# Patient Record
Sex: MALE | Race: WHITE | NOT HISPANIC OR LATINO
[De-identification: names, ages, dates, MRNs, and addresses within clinical notes are randomized per-mention and may not be internally consistent; named-entity substitution may affect disease eponyms.]

---

## 2017-05-18 ENCOUNTER — APPOINTMENT (OUTPATIENT)
Dept: UROLOGY | Facility: CLINIC | Age: 77
End: 2017-05-18

## 2017-05-18 ENCOUNTER — OUTPATIENT (OUTPATIENT)
Dept: OUTPATIENT SERVICES | Facility: HOSPITAL | Age: 77
LOS: 1 days | End: 2017-05-18
Payer: MEDICARE

## 2017-05-18 VITALS
TEMPERATURE: 98.3 F | SYSTOLIC BLOOD PRESSURE: 118 MMHG | HEIGHT: 70 IN | HEART RATE: 74 BPM | BODY MASS INDEX: 38.65 KG/M2 | WEIGHT: 270 LBS | RESPIRATION RATE: 16 BRPM | DIASTOLIC BLOOD PRESSURE: 67 MMHG

## 2017-05-18 DIAGNOSIS — R35.0 FREQUENCY OF MICTURITION: ICD-10-CM

## 2017-05-18 DIAGNOSIS — D49.519 NEOPLASM OF UNSPECIFIED BEHAVIOR OF UNSPECIFIED KIDNEY: ICD-10-CM

## 2017-05-18 DIAGNOSIS — G56.00 CARPAL TUNNEL SYNDROME, UNSPECIFIED UPPER LIMB: Chronic | ICD-10-CM

## 2017-05-18 DIAGNOSIS — D49.59 NEOPLASM UNSPECIFIED BEHAVIOR OF OTHER GENITOURINARY ORGAN: ICD-10-CM

## 2017-05-18 PROCEDURE — 96401 CHEMO ANTI-NEOPL SQ/IM: CPT

## 2017-05-18 PROCEDURE — 96402 CHEMO HORMON ANTINEOPL SQ/IM: CPT

## 2017-05-18 PROCEDURE — 88112 CYTOPATH CELL ENHANCE TECH: CPT | Mod: 26

## 2017-05-18 RX ORDER — DENOSUMAB 60 MG/ML
60 INJECTION SUBCUTANEOUS
Qty: 1 | Refills: 0 | Status: COMPLETED | OUTPATIENT
Start: 2017-05-18

## 2017-05-18 RX ORDER — LEUPROLIDE ACETATE 45 MG
45 KIT INTRAMUSCULAR
Qty: 1 | Refills: 0 | Status: COMPLETED | OUTPATIENT
Start: 2017-05-18 | End: 2017-05-18

## 2017-05-18 RX ORDER — DENOSUMAB 60 MG/ML
60 INJECTION SUBCUTANEOUS
Qty: 1 | Refills: 0 | Status: COMPLETED | OUTPATIENT
Start: 2017-05-18 | End: 2017-05-18

## 2017-05-18 RX ORDER — LEUPROLIDE ACETATE 45 MG
45 KIT INTRAMUSCULAR
Qty: 1 | Refills: 0 | Status: COMPLETED | OUTPATIENT
Start: 2017-05-18

## 2017-05-18 RX ADMIN — DENOSUMAB 0 MG/ML: 60 INJECTION SUBCUTANEOUS at 00:00

## 2017-05-18 RX ADMIN — LEUPROLIDE ACETATE 0 MG: KIT at 00:00

## 2017-05-21 ENCOUNTER — MESSAGE (OUTPATIENT)
Age: 77
End: 2017-05-21

## 2017-05-21 LAB
ALBUMIN SERPL ELPH-MCNC: 4 G/DL
ALP BLD-CCNC: 71 U/L
ALT SERPL-CCNC: 7 U/L
ANION GAP SERPL CALC-SCNC: 20 MMOL/L
APPEARANCE: CLEAR
AST SERPL-CCNC: 10 U/L
BACTERIA UR CULT: NORMAL
BACTERIA: NEGATIVE
BASOPHILS # BLD AUTO: 0.01 K/UL
BASOPHILS NFR BLD AUTO: 0.2 %
BILIRUB SERPL-MCNC: 0.3 MG/DL
BILIRUBIN URINE: NEGATIVE
BLOOD URINE: NEGATIVE
BUN SERPL-MCNC: 64 MG/DL
CALCIUM SERPL-MCNC: 10.2 MG/DL
CHLORIDE SERPL-SCNC: 105 MMOL/L
CO2 SERPL-SCNC: 13 MMOL/L
COLOR: YELLOW
CORE LAB FLUID CYTOLOGY: NORMAL
CREAT SERPL-MCNC: 2.7 MG/DL
EOSINOPHIL # BLD AUTO: 0.1 K/UL
EOSINOPHIL NFR BLD AUTO: 1.7 %
GLUCOSE QUALITATIVE U: NORMAL MG/DL
GLUCOSE SERPL-MCNC: 170 MG/DL
HCT VFR BLD CALC: 31.8 %
HGB BLD-MCNC: 10 G/DL
HYALINE CASTS: 9 /LPF
IMM GRANULOCYTES NFR BLD AUTO: 0.2 %
KETONES URINE: NEGATIVE
LEUKOCYTE ESTERASE URINE: NEGATIVE
LYMPHOCYTES # BLD AUTO: 1.82 K/UL
LYMPHOCYTES NFR BLD AUTO: 30.2 %
MAN DIFF?: NORMAL
MCHC RBC-ENTMCNC: 30.2 PG
MCHC RBC-ENTMCNC: 31.4 GM/DL
MCV RBC AUTO: 96.1 FL
MICROSCOPIC-UA: NORMAL
MONOCYTES # BLD AUTO: 0.31 K/UL
MONOCYTES NFR BLD AUTO: 5.1 %
NEUTROPHILS # BLD AUTO: 3.77 K/UL
NEUTROPHILS NFR BLD AUTO: 62.6 %
NITRITE URINE: NEGATIVE
PH URINE: 5
PLATELET # BLD AUTO: 202 K/UL
POTASSIUM SERPL-SCNC: 6.8 MMOL/L
PROT SERPL-MCNC: 7.6 G/DL
PROTEIN URINE: NEGATIVE MG/DL
PSA SERPL-MCNC: <0.01 NG/ML
RBC # BLD: 3.31 M/UL
RBC # FLD: 15.1 %
RED BLOOD CELLS URINE: 4 /HPF
SODIUM SERPL-SCNC: 138 MMOL/L
SPECIFIC GRAVITY URINE: 1.02
SQUAMOUS EPITHELIAL CELLS: 2 /HPF
TESTOST SERPL-MCNC: 21.6 NG/DL
UROBILINOGEN URINE: NORMAL MG/DL
WBC # FLD AUTO: 6.02 K/UL
WHITE BLOOD CELLS URINE: 1 /HPF

## 2017-05-23 DIAGNOSIS — D49.59 NEOPLASM OF UNSPECIFIED BEHAVIOR OF OTHER GENITOURINARY ORGAN: ICD-10-CM

## 2017-05-23 DIAGNOSIS — R53.83 OTHER FATIGUE: ICD-10-CM

## 2017-05-23 DIAGNOSIS — E66.01 MORBID (SEVERE) OBESITY DUE TO EXCESS CALORIES: ICD-10-CM

## 2017-05-23 DIAGNOSIS — E11.9 TYPE 2 DIABETES MELLITUS WITHOUT COMPLICATIONS: ICD-10-CM

## 2017-05-23 DIAGNOSIS — C79.51 SECONDARY MALIGNANT NEOPLASM OF BONE: ICD-10-CM

## 2017-05-23 DIAGNOSIS — R53.81 OTHER MALAISE: ICD-10-CM

## 2017-05-23 DIAGNOSIS — C61 MALIGNANT NEOPLASM OF PROSTATE: ICD-10-CM

## 2017-05-23 DIAGNOSIS — D49.519 NEOPLASM OF UNSPECIFIED BEHAVIOR OF UNSPECIFIED KIDNEY: ICD-10-CM

## 2017-07-19 ENCOUNTER — MEDICATION RENEWAL (OUTPATIENT)
Age: 77
End: 2017-07-19

## 2017-07-21 ENCOUNTER — MESSAGE (OUTPATIENT)
Age: 77
End: 2017-07-21

## 2017-07-26 ENCOUNTER — APPOINTMENT (OUTPATIENT)
Dept: UROLOGY | Facility: CLINIC | Age: 77
End: 2017-07-26

## 2017-08-10 ENCOUNTER — TRANSCRIPTION ENCOUNTER (OUTPATIENT)
Age: 77
End: 2017-08-10

## 2017-08-21 ENCOUNTER — OUTPATIENT (OUTPATIENT)
Dept: OUTPATIENT SERVICES | Facility: HOSPITAL | Age: 77
LOS: 1 days | End: 2017-08-21
Payer: MEDICARE

## 2017-08-21 VITALS
HEART RATE: 66 BPM | WEIGHT: 257.94 LBS | TEMPERATURE: 98 F | DIASTOLIC BLOOD PRESSURE: 80 MMHG | SYSTOLIC BLOOD PRESSURE: 153 MMHG | HEIGHT: 69 IN | RESPIRATION RATE: 16 BRPM | OXYGEN SATURATION: 96 %

## 2017-08-21 DIAGNOSIS — Z87.448 PERSONAL HISTORY OF OTHER DISEASES OF URINARY SYSTEM: Chronic | ICD-10-CM

## 2017-08-21 DIAGNOSIS — G56.00 CARPAL TUNNEL SYNDROME, UNSPECIFIED UPPER LIMB: Chronic | ICD-10-CM

## 2017-08-21 DIAGNOSIS — N20.1 CALCULUS OF URETER: ICD-10-CM

## 2017-08-21 DIAGNOSIS — N20.0 CALCULUS OF KIDNEY: ICD-10-CM

## 2017-08-21 DIAGNOSIS — G47.33 OBSTRUCTIVE SLEEP APNEA (ADULT) (PEDIATRIC): ICD-10-CM

## 2017-08-21 DIAGNOSIS — I10 ESSENTIAL (PRIMARY) HYPERTENSION: ICD-10-CM

## 2017-08-21 DIAGNOSIS — Z01.818 ENCOUNTER FOR OTHER PREPROCEDURAL EXAMINATION: ICD-10-CM

## 2017-08-21 LAB
ANION GAP SERPL CALC-SCNC: 18 MMOL/L — HIGH (ref 5–17)
BUN SERPL-MCNC: 59 MG/DL — HIGH (ref 7–23)
CALCIUM SERPL-MCNC: 9.6 MG/DL — SIGNIFICANT CHANGE UP (ref 8.4–10.5)
CHLORIDE SERPL-SCNC: 103 MMOL/L — SIGNIFICANT CHANGE UP (ref 96–108)
CO2 SERPL-SCNC: 19 MMOL/L — LOW (ref 22–31)
CREAT SERPL-MCNC: 1.91 MG/DL — HIGH (ref 0.5–1.3)
GLUCOSE SERPL-MCNC: 185 MG/DL — HIGH (ref 70–99)
HBA1C BLD-MCNC: 8.5 % — HIGH (ref 4–5.6)
HCT VFR BLD CALC: 29.5 % — LOW (ref 39–50)
HGB BLD-MCNC: 9.3 G/DL — LOW (ref 13–17)
MCHC RBC-ENTMCNC: 29.3 PG — SIGNIFICANT CHANGE UP (ref 27–34)
MCHC RBC-ENTMCNC: 31.5 GM/DL — LOW (ref 32–36)
MCV RBC AUTO: 93.1 FL — SIGNIFICANT CHANGE UP (ref 80–100)
PLATELET # BLD AUTO: 205 K/UL — SIGNIFICANT CHANGE UP (ref 150–400)
POTASSIUM SERPL-MCNC: 4.9 MMOL/L — SIGNIFICANT CHANGE UP (ref 3.5–5.3)
POTASSIUM SERPL-SCNC: 4.9 MMOL/L — SIGNIFICANT CHANGE UP (ref 3.5–5.3)
RBC # BLD: 3.17 M/UL — LOW (ref 4.2–5.8)
RBC # FLD: 14.9 % — HIGH (ref 10.3–14.5)
SODIUM SERPL-SCNC: 140 MMOL/L — SIGNIFICANT CHANGE UP (ref 135–145)
WBC # BLD: 5.4 K/UL — SIGNIFICANT CHANGE UP (ref 3.8–10.5)
WBC # FLD AUTO: 5.4 K/UL — SIGNIFICANT CHANGE UP (ref 3.8–10.5)

## 2017-08-21 PROCEDURE — G0463: CPT

## 2017-08-21 PROCEDURE — 85027 COMPLETE CBC AUTOMATED: CPT

## 2017-08-21 PROCEDURE — 83036 HEMOGLOBIN GLYCOSYLATED A1C: CPT

## 2017-08-21 PROCEDURE — 87086 URINE CULTURE/COLONY COUNT: CPT

## 2017-08-21 PROCEDURE — 87186 SC STD MICRODIL/AGAR DIL: CPT

## 2017-08-21 PROCEDURE — 80048 BASIC METABOLIC PNL TOTAL CA: CPT

## 2017-08-21 NOTE — H&P PST ADULT - PMH
Anemia    Calculus of kidney    Diabetes Mellitus Type II    Glaucoma    History of Prostate Cancer  dx 1998  HTN (Hypertension)    Hyperlipidemia    Obese    Pneumonia  1/2013 treated with PO antibiotics  Seasonal allergies    Sleep apnea  on CPAP  Smoker  1.5 PPD x 25 years quit 1977 Anemia    Calculus of kidney    Diabetes Mellitus Type II    Glaucoma    History of Prostate Cancer  dx 1998 s/p Brachytherapy  HTN (Hypertension)    Hyperlipidemia    Obese    Pneumonia  1/2013 treated with PO antibiotics  Seasonal allergies    Sleep apnea  on CPAP  Smoker  1.5 PPD x 25 years quit 1977

## 2017-08-21 NOTE — H&P PST ADULT - HISTORY OF PRESENT ILLNESS
77 yrs old male with h/o urethral stricture presents for preop eval to have cystoscopy and urethral dilatation on 7/7/10. pt states that he had urethral  stricture for 2 yrs now and has urinary hesitancy and needed to take care of it 77 yrs old male with h/o prostate cancer, urethral stricture-s/p artificial urinary sphincter in 2015-f/u evaluation- elevated creatinine in 07/2017. Pt had urology consult- s/p Ct scan renal revealed calculus of kidney/ureter-s/p left nephrostomy tube insertion in 07/2017- presents for preop eval to have left nephroscopy, laser of stones, left antegrade ureteroscopy with laser, exchange of  left nephrostomy to left nephroureteral catheter on 08/28/2017

## 2017-08-21 NOTE — H&P PST ADULT - PSH
Carpal tunnel syndrome  right wrist 2013  H/O malignant melanoma of skin right ear, excision of neoplasm 11/2011    H/O nephrostomy  07/2017  History of Colonoscopy    History of Cystoscopy multiple  urethral dilation, intravesical anesthetic  History of radiation therapy  for prostate cancer 1998, brachytherapy  Lipoma resection from stomach in 1990    Macular Pucker Left  surgery 6/2010

## 2017-08-21 NOTE — H&P PST ADULT - PROBLEM SELECTOR PLAN 1
Left nephroscopy, laser of stones, left antegrade ureteroscopy with laser, exchange of  left nephrostomy to left nephroureteral catheter

## 2017-08-24 RX ORDER — CIPROFLOXACIN HYDROCHLORIDE 500 MG/1
500 TABLET, FILM COATED ORAL TWICE DAILY
Qty: 20 | Refills: 0 | Status: DISCONTINUED | COMMUNITY
Start: 2017-08-24 | End: 2017-08-24

## 2017-08-27 ENCOUNTER — FORM ENCOUNTER (OUTPATIENT)
Age: 77
End: 2017-08-27

## 2017-08-28 ENCOUNTER — OUTPATIENT (OUTPATIENT)
Dept: OUTPATIENT SERVICES | Facility: HOSPITAL | Age: 77
LOS: 1 days | End: 2017-08-28
Payer: MEDICARE

## 2017-08-28 ENCOUNTER — APPOINTMENT (OUTPATIENT)
Dept: UROLOGY | Facility: HOSPITAL | Age: 77
End: 2017-08-28

## 2017-08-28 ENCOUNTER — RESULT REVIEW (OUTPATIENT)
Age: 77
End: 2017-08-28

## 2017-08-28 ENCOUNTER — TRANSCRIPTION ENCOUNTER (OUTPATIENT)
Age: 77
End: 2017-08-28

## 2017-08-28 VITALS
OXYGEN SATURATION: 96 % | RESPIRATION RATE: 16 BRPM | TEMPERATURE: 98 F | HEART RATE: 55 BPM | SYSTOLIC BLOOD PRESSURE: 154 MMHG | DIASTOLIC BLOOD PRESSURE: 70 MMHG

## 2017-08-28 VITALS
RESPIRATION RATE: 18 BRPM | OXYGEN SATURATION: 97 % | HEART RATE: 71 BPM | SYSTOLIC BLOOD PRESSURE: 129 MMHG | TEMPERATURE: 99 F | DIASTOLIC BLOOD PRESSURE: 67 MMHG | WEIGHT: 257.94 LBS | HEIGHT: 69 IN

## 2017-08-28 DIAGNOSIS — N20.1 CALCULUS OF URETER: ICD-10-CM

## 2017-08-28 DIAGNOSIS — G56.00 CARPAL TUNNEL SYNDROME, UNSPECIFIED UPPER LIMB: Chronic | ICD-10-CM

## 2017-08-28 DIAGNOSIS — Z87.448 PERSONAL HISTORY OF OTHER DISEASES OF URINARY SYSTEM: Chronic | ICD-10-CM

## 2017-08-28 DIAGNOSIS — N20.0 CALCULUS OF KIDNEY: ICD-10-CM

## 2017-08-28 LAB — SURGICAL PATHOLOGY STUDY: SIGNIFICANT CHANGE UP

## 2017-08-28 PROCEDURE — 74000: CPT | Mod: 26

## 2017-08-28 PROCEDURE — 82365 CALCULUS SPECTROSCOPY: CPT

## 2017-08-28 PROCEDURE — C1887: CPT

## 2017-08-28 PROCEDURE — C1758: CPT

## 2017-08-28 PROCEDURE — 50080 PERQ NL/PL LITHOTRP SMPL<2CM: CPT | Mod: LT

## 2017-08-28 PROCEDURE — 50433 PLMT NEPHROURETERAL CATHETER: CPT | Mod: LT

## 2017-08-28 PROCEDURE — 52353 CYSTOURETERO W/LITHOTRIPSY: CPT | Mod: 59,LT

## 2017-08-28 PROCEDURE — 88300 SURGICAL PATH GROSS: CPT | Mod: 26

## 2017-08-28 PROCEDURE — 76000 FLUOROSCOPY <1 HR PHYS/QHP: CPT

## 2017-08-28 PROCEDURE — 74018 RADEX ABDOMEN 1 VIEW: CPT

## 2017-08-28 PROCEDURE — 52353 CYSTOURETERO W/LITHOTRIPSY: CPT | Mod: LT

## 2017-08-28 PROCEDURE — 88300 SURGICAL PATH GROSS: CPT

## 2017-08-28 PROCEDURE — C1894: CPT

## 2017-08-28 PROCEDURE — C1769: CPT

## 2017-08-28 PROCEDURE — C2617: CPT

## 2017-08-28 PROCEDURE — C1889: CPT

## 2017-08-28 RX ORDER — ONDANSETRON 8 MG/1
4 TABLET, FILM COATED ORAL
Qty: 0 | Refills: 0 | Status: DISCONTINUED | OUTPATIENT
Start: 2017-08-28 | End: 2017-08-28

## 2017-08-28 RX ORDER — OXYCODONE HYDROCHLORIDE 5 MG/1
1 TABLET ORAL
Qty: 28 | Refills: 0
Start: 2017-08-28 | End: 2017-09-04

## 2017-08-28 RX ORDER — CEFAZOLIN SODIUM 1 G
2000 VIAL (EA) INJECTION ONCE
Qty: 0 | Refills: 0 | Status: COMPLETED | OUTPATIENT
Start: 2017-08-28 | End: 2017-08-28

## 2017-08-28 RX ORDER — SODIUM CHLORIDE 9 MG/ML
1000 INJECTION, SOLUTION INTRAVENOUS
Qty: 0 | Refills: 0 | Status: DISCONTINUED | OUTPATIENT
Start: 2017-08-28 | End: 2017-09-12

## 2017-08-28 RX ORDER — SODIUM CHLORIDE 9 MG/ML
3 INJECTION INTRAMUSCULAR; INTRAVENOUS; SUBCUTANEOUS EVERY 8 HOURS
Qty: 0 | Refills: 0 | Status: DISCONTINUED | OUTPATIENT
Start: 2017-08-28 | End: 2017-08-28

## 2017-08-28 RX ORDER — HYDROMORPHONE HYDROCHLORIDE 2 MG/ML
0.5 INJECTION INTRAMUSCULAR; INTRAVENOUS; SUBCUTANEOUS
Qty: 0 | Refills: 0 | Status: DISCONTINUED | OUTPATIENT
Start: 2017-08-28 | End: 2017-08-28

## 2017-08-28 RX ADMIN — SODIUM CHLORIDE 125 MILLILITER(S): 9 INJECTION, SOLUTION INTRAVENOUS at 09:44

## 2017-08-28 NOTE — ASU DISCHARGE PLAN (ADULT/PEDIATRIC). - MEDICATION SUMMARY - MEDICATIONS TO TAKE
I will START or STAY ON the medications listed below when I get home from the hospital:    b12  -- 1000  mg  -- Indication: For vitamin    acetaminophen-oxycodone 325 mg-5 mg oral tablet  -- 1-2  tab(s) by mouth every 4-6 hours, As Needed MDD:6  -- Caution federal law prohibits the transfer of this drug to any person other  than the person for whom it was prescribed.  May cause drowsiness.  Alcohol may intensify this effect.  Use care when operating dangerous machinery.  This prescription cannot be refilled.  This product contains acetaminophen.  Do not use  with any other product containing acetaminophen to prevent possible liver damage.  Using more of this medication than prescribed may cause serious breathing problems.    -- Indication: For pain    glimepiride 4 mg oral tablet  --  by mouth 2 times a day  -- Indication: For diabetes    simvastatin 20 mg oral tablet  -- 1 tab(s) by mouth once a day (at bedtime)  -- Indication: For Cholesterol    Lupron Depot 45 mg/6 months intramuscular injection, extended release  --  intramuscular  every 6 months  last dose Oct. 10.2015  -- Indication: For prostate cancer    carvedilol 12.5 mg oral tablet  -- 1 tab(s) by mouth 2 times a day  -- Indication: For heart    hydroCHLOROthiazide 12.5 mg oral tablet  -- 1 tab(s) by mouth once a day  -- Indication: For blood pressure    ferrous sulfate 324 mg (65 mg elemental iron) oral delayed release tablet  --  by mouth   -- Indication: For iron    calcium citrate  --  by mouth 1000 mg   -- Indication: For vitamin    Bactrim  mg-160 mg oral tablet  -- 1 tab(s) by mouth 2 times a day  -- Indication: For antibiotic    Vitamin C 500 mg oral tablet  -- 1 tab(s) by mouth once a day  -- Indication: For vitamin

## 2017-08-28 NOTE — BRIEF OPERATIVE NOTE - PROCEDURE
Ureteroscopy of left ureter with use of laser  08/28/2017    Active  [email or web address]  Percutaneous nephrostolithotomy  08/28/2017    Active  [email or web address]

## 2017-08-29 ENCOUNTER — RX RENEWAL (OUTPATIENT)
Age: 77
End: 2017-08-29

## 2017-08-29 LAB — COMPN STONE: SIGNIFICANT CHANGE UP

## 2017-09-06 ENCOUNTER — APPOINTMENT (OUTPATIENT)
Dept: UROLOGY | Facility: CLINIC | Age: 77
End: 2017-09-06
Payer: MEDICARE

## 2017-09-06 DIAGNOSIS — N13.2 HYDRONEPHROSIS WITH RENAL AND URETERAL CALCULOUS OBSTRUCTION: ICD-10-CM

## 2017-09-06 DIAGNOSIS — N20.1 CALCULUS OF URETER: ICD-10-CM

## 2017-09-06 DIAGNOSIS — Z13.228 ENCOUNTER FOR SCREENING FOR OTHER METABOLIC DISORDERS: ICD-10-CM

## 2017-09-06 PROCEDURE — 99214 OFFICE O/P EST MOD 30 MIN: CPT | Mod: 24

## 2017-11-02 ENCOUNTER — FORM ENCOUNTER (OUTPATIENT)
Age: 77
End: 2017-11-02

## 2017-11-03 ENCOUNTER — APPOINTMENT (OUTPATIENT)
Dept: ULTRASOUND IMAGING | Facility: IMAGING CENTER | Age: 77
End: 2017-11-03
Payer: MEDICARE

## 2017-11-03 ENCOUNTER — OUTPATIENT (OUTPATIENT)
Dept: OUTPATIENT SERVICES | Facility: HOSPITAL | Age: 77
LOS: 1 days | End: 2017-11-03
Payer: MEDICARE

## 2017-11-03 ENCOUNTER — APPOINTMENT (OUTPATIENT)
Dept: UROLOGY | Facility: CLINIC | Age: 77
End: 2017-11-03
Payer: MEDICARE

## 2017-11-03 DIAGNOSIS — G56.00 CARPAL TUNNEL SYNDROME, UNSPECIFIED UPPER LIMB: Chronic | ICD-10-CM

## 2017-11-03 DIAGNOSIS — N20.9 URINARY CALCULUS, UNSPECIFIED: ICD-10-CM

## 2017-11-03 DIAGNOSIS — Z87.448 PERSONAL HISTORY OF OTHER DISEASES OF URINARY SYSTEM: Chronic | ICD-10-CM

## 2017-11-03 PROCEDURE — 76770 US EXAM ABDO BACK WALL COMP: CPT | Mod: 26

## 2017-11-03 PROCEDURE — 76770 US EXAM ABDO BACK WALL COMP: CPT

## 2017-11-03 PROCEDURE — 99214 OFFICE O/P EST MOD 30 MIN: CPT | Mod: 24

## 2017-11-09 ENCOUNTER — OUTPATIENT (OUTPATIENT)
Dept: OUTPATIENT SERVICES | Facility: HOSPITAL | Age: 77
LOS: 1 days | End: 2017-11-09
Payer: MEDICARE

## 2017-11-09 ENCOUNTER — APPOINTMENT (OUTPATIENT)
Dept: UROLOGY | Facility: CLINIC | Age: 77
End: 2017-11-09
Payer: MEDICARE

## 2017-11-09 VITALS
SYSTOLIC BLOOD PRESSURE: 143 MMHG | HEART RATE: 62 BPM | TEMPERATURE: 98.6 F | RESPIRATION RATE: 16 BRPM | DIASTOLIC BLOOD PRESSURE: 73 MMHG

## 2017-11-09 DIAGNOSIS — Z87.448 PERSONAL HISTORY OF OTHER DISEASES OF URINARY SYSTEM: Chronic | ICD-10-CM

## 2017-11-09 DIAGNOSIS — R35.0 FREQUENCY OF MICTURITION: ICD-10-CM

## 2017-11-09 DIAGNOSIS — G56.00 CARPAL TUNNEL SYNDROME, UNSPECIFIED UPPER LIMB: Chronic | ICD-10-CM

## 2017-11-09 PROCEDURE — 99214 OFFICE O/P EST MOD 30 MIN: CPT

## 2017-11-09 PROCEDURE — 96402 CHEMO HORMON ANTINEOPL SQ/IM: CPT

## 2017-11-09 PROCEDURE — 96401 CHEMO ANTI-NEOPL SQ/IM: CPT

## 2017-11-09 RX ORDER — LEUPROLIDE ACETATE 45 MG
45 KIT INTRAMUSCULAR
Qty: 1 | Refills: 0 | Status: COMPLETED | OUTPATIENT
Start: 2017-11-09

## 2017-11-09 RX ORDER — DENOSUMAB 60 MG/ML
60 INJECTION SUBCUTANEOUS
Qty: 0 | Refills: 0 | Status: COMPLETED | OUTPATIENT
Start: 2017-11-09

## 2017-11-09 RX ORDER — DENOSUMAB 60 MG/ML
60 INJECTION SUBCUTANEOUS
Qty: 1 | Refills: 0 | Status: COMPLETED | OUTPATIENT
Start: 2017-11-09 | End: 2017-11-09

## 2017-11-09 RX ADMIN — LEUPROLIDE ACETATE 0 MG: KIT at 00:00

## 2017-11-09 RX ADMIN — DENOSUMAB 0 MG/ML: 60 INJECTION SUBCUTANEOUS at 00:00

## 2017-11-10 LAB
ALBUMIN SERPL ELPH-MCNC: 4.1 G/DL
ALP BLD-CCNC: 63 U/L
ALT SERPL-CCNC: 17 U/L
ANION GAP SERPL CALC-SCNC: 13 MMOL/L
APPEARANCE: CLEAR
AST SERPL-CCNC: 12 U/L
BACTERIA: NEGATIVE
BASOPHILS # BLD AUTO: 0.01 K/UL
BASOPHILS NFR BLD AUTO: 0.2 %
BILIRUB SERPL-MCNC: 0.2 MG/DL
BILIRUBIN URINE: NEGATIVE
BLOOD URINE: NEGATIVE
BUN SERPL-MCNC: 50 MG/DL
CALCIUM SERPL-MCNC: 10.5 MG/DL
CHLORIDE SERPL-SCNC: 97 MMOL/L
CO2 SERPL-SCNC: 27 MMOL/L
COLOR: YELLOW
CORE LAB FLUID CYTOLOGY: NORMAL
CREAT SERPL-MCNC: 1.8 MG/DL
EOSINOPHIL # BLD AUTO: 0.15 K/UL
EOSINOPHIL NFR BLD AUTO: 2.4 %
GLUCOSE QUALITATIVE U: NEGATIVE MG/DL
GLUCOSE SERPL-MCNC: 227 MG/DL
HCT VFR BLD CALC: 32.8 %
HGB BLD-MCNC: 10.3 G/DL
HYALINE CASTS: 1 /LPF
IMM GRANULOCYTES NFR BLD AUTO: 0 %
KETONES URINE: NEGATIVE
LEUKOCYTE ESTERASE URINE: NEGATIVE
LYMPHOCYTES # BLD AUTO: 2.26 K/UL
LYMPHOCYTES NFR BLD AUTO: 36 %
MAN DIFF?: NORMAL
MCHC RBC-ENTMCNC: 29.6 PG
MCHC RBC-ENTMCNC: 31.4 GM/DL
MCV RBC AUTO: 94.3 FL
MICROSCOPIC-UA: NORMAL
MONOCYTES # BLD AUTO: 0.39 K/UL
MONOCYTES NFR BLD AUTO: 6.2 %
NEUTROPHILS # BLD AUTO: 3.47 K/UL
NEUTROPHILS NFR BLD AUTO: 55.2 %
NITRITE URINE: NEGATIVE
PH URINE: 5
PLATELET # BLD AUTO: 180 K/UL
POTASSIUM SERPL-SCNC: 5.1 MMOL/L
PROT SERPL-MCNC: 8.1 G/DL
PROTEIN URINE: ABNORMAL MG/DL
PSA SERPL-MCNC: <0.01 NG/ML
RBC # BLD: 3.48 M/UL
RBC # FLD: 16 %
RED BLOOD CELLS URINE: 3 /HPF
SODIUM SERPL-SCNC: 137 MMOL/L
SPECIFIC GRAVITY URINE: 1.02
SQUAMOUS EPITHELIAL CELLS: 0 /HPF
TESTOST SERPL-MCNC: 17.5 NG/DL
UROBILINOGEN URINE: NEGATIVE MG/DL
WBC # FLD AUTO: 6.28 K/UL
WHITE BLOOD CELLS URINE: 1 /HPF

## 2017-11-10 RX ORDER — LEUPROLIDE ACETATE 45 MG
45 KIT INTRAMUSCULAR
Qty: 1 | Refills: 0 | Status: COMPLETED | COMMUNITY
Start: 2017-11-09 | End: 2017-11-09

## 2017-11-12 LAB — BACTERIA UR CULT: ABNORMAL

## 2017-11-15 DIAGNOSIS — N20.0 CALCULUS OF KIDNEY: ICD-10-CM

## 2017-11-15 DIAGNOSIS — E66.01 MORBID (SEVERE) OBESITY DUE TO EXCESS CALORIES: ICD-10-CM

## 2017-11-15 DIAGNOSIS — Z91.89 OTHER SPECIFIED PERSONAL RISK FACTORS, NOT ELSEWHERE CLASSIFIED: ICD-10-CM

## 2017-11-15 DIAGNOSIS — C61 MALIGNANT NEOPLASM OF PROSTATE: ICD-10-CM

## 2018-05-02 ENCOUNTER — APPOINTMENT (OUTPATIENT)
Dept: UROLOGY | Facility: CLINIC | Age: 78
End: 2018-05-02

## 2018-05-02 ENCOUNTER — FORM ENCOUNTER (OUTPATIENT)
Age: 78
End: 2018-05-02

## 2018-05-03 ENCOUNTER — OUTPATIENT (OUTPATIENT)
Dept: OUTPATIENT SERVICES | Facility: HOSPITAL | Age: 78
LOS: 1 days | End: 2018-05-03
Payer: MEDICARE

## 2018-05-03 ENCOUNTER — APPOINTMENT (OUTPATIENT)
Dept: ULTRASOUND IMAGING | Facility: IMAGING CENTER | Age: 78
End: 2018-05-03
Payer: MEDICARE

## 2018-05-03 DIAGNOSIS — G56.00 CARPAL TUNNEL SYNDROME, UNSPECIFIED UPPER LIMB: Chronic | ICD-10-CM

## 2018-05-03 DIAGNOSIS — N20.9 URINARY CALCULUS, UNSPECIFIED: ICD-10-CM

## 2018-05-03 DIAGNOSIS — Z87.448 PERSONAL HISTORY OF OTHER DISEASES OF URINARY SYSTEM: Chronic | ICD-10-CM

## 2018-05-03 PROCEDURE — 76770 US EXAM ABDO BACK WALL COMP: CPT

## 2018-05-03 PROCEDURE — 76770 US EXAM ABDO BACK WALL COMP: CPT | Mod: 26

## 2018-05-10 ENCOUNTER — OUTPATIENT (OUTPATIENT)
Dept: OUTPATIENT SERVICES | Facility: HOSPITAL | Age: 78
LOS: 1 days | End: 2018-05-10
Payer: MEDICARE

## 2018-05-10 ENCOUNTER — APPOINTMENT (OUTPATIENT)
Dept: UROLOGY | Facility: CLINIC | Age: 78
End: 2018-05-10
Payer: MEDICARE

## 2018-05-10 ENCOUNTER — LABORATORY RESULT (OUTPATIENT)
Age: 78
End: 2018-05-10

## 2018-05-10 VITALS
DIASTOLIC BLOOD PRESSURE: 78 MMHG | SYSTOLIC BLOOD PRESSURE: 140 MMHG | RESPIRATION RATE: 17 BRPM | TEMPERATURE: 99.6 F | HEART RATE: 74 BPM

## 2018-05-10 DIAGNOSIS — Z87.448 PERSONAL HISTORY OF OTHER DISEASES OF URINARY SYSTEM: Chronic | ICD-10-CM

## 2018-05-10 DIAGNOSIS — R35.0 FREQUENCY OF MICTURITION: ICD-10-CM

## 2018-05-10 DIAGNOSIS — G56.00 CARPAL TUNNEL SYNDROME, UNSPECIFIED UPPER LIMB: Chronic | ICD-10-CM

## 2018-05-10 LAB
BASOPHILS # BLD AUTO: 0 K/UL
BASOPHILS NFR BLD AUTO: 0 %
EOSINOPHIL # BLD AUTO: 0.1 K/UL
EOSINOPHIL NFR BLD AUTO: 2.1 %
HBA1C MFR BLD HPLC: 12.8 %
HCT VFR BLD CALC: 30.8 %
HGB BLD-MCNC: 9.9 G/DL
IMM GRANULOCYTES NFR BLD AUTO: 0.2 %
LYMPHOCYTES # BLD AUTO: 1.21 K/UL
LYMPHOCYTES NFR BLD AUTO: 25.5 %
MAN DIFF?: NORMAL
MCHC RBC-ENTMCNC: 30.7 PG
MCHC RBC-ENTMCNC: 32.1 GM/DL
MCV RBC AUTO: 95.7 FL
MONOCYTES # BLD AUTO: 0.42 K/UL
MONOCYTES NFR BLD AUTO: 8.9 %
NEUTROPHILS # BLD AUTO: 3 K/UL
NEUTROPHILS NFR BLD AUTO: 63.3 %
PLATELET # BLD AUTO: 208 K/UL
RBC # BLD: 3.22 M/UL
RBC # FLD: 15.4 %
WBC # FLD AUTO: 4.74 K/UL

## 2018-05-10 PROCEDURE — 99214 OFFICE O/P EST MOD 30 MIN: CPT

## 2018-05-10 RX ORDER — LEUPROLIDE ACETATE 45 MG
45 KIT INTRAMUSCULAR
Qty: 1 | Refills: 0 | Status: COMPLETED | OUTPATIENT
Start: 2018-05-10

## 2018-05-10 RX ORDER — LEUPROLIDE ACETATE 45 MG
45 KIT INTRAMUSCULAR
Qty: 1 | Refills: 0 | Status: COMPLETED | OUTPATIENT
Start: 2018-05-10 | End: 2018-05-10

## 2018-05-10 RX ADMIN — LEUPROLIDE ACETATE 0 MG: KIT at 00:00

## 2018-05-11 LAB
ALBUMIN SERPL ELPH-MCNC: 3.8 G/DL
ALP BLD-CCNC: 77 U/L
ALT SERPL-CCNC: 8 U/L
ANION GAP SERPL CALC-SCNC: 17 MMOL/L
APPEARANCE: CLEAR
AST SERPL-CCNC: 9 U/L
BACTERIA: NEGATIVE
BILIRUB SERPL-MCNC: 0.3 MG/DL
BILIRUBIN URINE: NEGATIVE
BLOOD URINE: ABNORMAL
BUN SERPL-MCNC: 51 MG/DL
CALCIUM SERPL-MCNC: 10.4 MG/DL
CHLORIDE SERPL-SCNC: 98 MMOL/L
CO2 SERPL-SCNC: 24 MMOL/L
COLOR: YELLOW
CREAT SERPL-MCNC: 1.98 MG/DL
GLUCOSE QUALITATIVE U: 500 MG/DL
GLUCOSE SERPL-MCNC: 308 MG/DL
HYALINE CASTS: 2 /LPF
KETONES URINE: NEGATIVE
LEUKOCYTE ESTERASE URINE: NEGATIVE
MICROSCOPIC-UA: NORMAL
NITRITE URINE: NEGATIVE
PH URINE: 5.5
POTASSIUM SERPL-SCNC: 5.3 MMOL/L
PROT SERPL-MCNC: 7.9 G/DL
PROTEIN URINE: 30 MG/DL
PSA SERPL-MCNC: <0.01 NG/ML
RED BLOOD CELLS URINE: 4 /HPF
SODIUM SERPL-SCNC: 139 MMOL/L
SPECIFIC GRAVITY URINE: 1.02
SQUAMOUS EPITHELIAL CELLS: 1 /HPF
T3FREE SERPL-MCNC: 2.36 PG/ML
T3RU NFR SERPL: 0.9 INDEX
T4 FREE SERPL-MCNC: 1.2 NG/DL
TESTOST SERPL-MCNC: 21 NG/DL
TSH SERPL-ACNC: 0.6 UIU/ML
UROBILINOGEN URINE: NEGATIVE MG/DL
WHITE BLOOD CELLS URINE: 4 /HPF

## 2018-05-11 PROCEDURE — 96402 CHEMO HORMON ANTINEOPL SQ/IM: CPT

## 2018-05-14 LAB
BACTERIA UR CULT: ABNORMAL
CORE LAB FLUID CYTOLOGY: NORMAL

## 2018-05-15 DIAGNOSIS — C61 MALIGNANT NEOPLASM OF PROSTATE: ICD-10-CM

## 2018-05-15 DIAGNOSIS — E11.9 TYPE 2 DIABETES MELLITUS WITHOUT COMPLICATIONS: ICD-10-CM

## 2018-05-15 DIAGNOSIS — R53.81 OTHER MALAISE: ICD-10-CM

## 2018-08-09 ENCOUNTER — APPOINTMENT (OUTPATIENT)
Dept: UROLOGY | Facility: CLINIC | Age: 78
End: 2018-08-09
Payer: MEDICARE

## 2018-08-09 ENCOUNTER — OUTPATIENT (OUTPATIENT)
Dept: OUTPATIENT SERVICES | Facility: HOSPITAL | Age: 78
LOS: 1 days | End: 2018-08-09
Payer: MEDICARE

## 2018-08-09 VITALS
RESPIRATION RATE: 17 BRPM | TEMPERATURE: 98.4 F | DIASTOLIC BLOOD PRESSURE: 76 MMHG | HEART RATE: 79 BPM | SYSTOLIC BLOOD PRESSURE: 136 MMHG

## 2018-08-09 DIAGNOSIS — Z87.448 PERSONAL HISTORY OF OTHER DISEASES OF URINARY SYSTEM: Chronic | ICD-10-CM

## 2018-08-09 DIAGNOSIS — G56.00 CARPAL TUNNEL SYNDROME, UNSPECIFIED UPPER LIMB: Chronic | ICD-10-CM

## 2018-08-09 DIAGNOSIS — R35.0 FREQUENCY OF MICTURITION: ICD-10-CM

## 2018-08-09 PROCEDURE — 99214 OFFICE O/P EST MOD 30 MIN: CPT

## 2018-08-09 PROCEDURE — 96401 CHEMO ANTI-NEOPL SQ/IM: CPT

## 2018-08-09 RX ORDER — DENOSUMAB 60 MG/ML
60 INJECTION SUBCUTANEOUS
Qty: 0 | Refills: 0 | Status: COMPLETED | OUTPATIENT
Start: 2018-08-09

## 2018-08-09 RX ORDER — DENOSUMAB 60 MG/ML
60 INJECTION SUBCUTANEOUS
Qty: 1 | Refills: 0 | Status: COMPLETED | OUTPATIENT
Start: 2018-08-09 | End: 2018-08-09

## 2018-08-09 RX ADMIN — DENOSUMAB 0 MG/ML: 60 INJECTION SUBCUTANEOUS at 00:00

## 2018-08-13 DIAGNOSIS — Z91.89 OTHER SPECIFIED PERSONAL RISK FACTORS, NOT ELSEWHERE CLASSIFIED: ICD-10-CM

## 2018-08-13 DIAGNOSIS — E55.9 VITAMIN D DEFICIENCY, UNSPECIFIED: ICD-10-CM

## 2018-08-13 DIAGNOSIS — E11.9 TYPE 2 DIABETES MELLITUS WITHOUT COMPLICATIONS: ICD-10-CM

## 2018-08-13 DIAGNOSIS — C61 MALIGNANT NEOPLASM OF PROSTATE: ICD-10-CM

## 2018-08-27 LAB
24R-OH-CALCIDIOL SERPL-MCNC: 29.8 PG/ML
25(OH)D3 SERPL-MCNC: 33 NG/ML
ALBUMIN SERPL ELPH-MCNC: 4 G/DL
ALP BLD-CCNC: 67 U/L
ALT SERPL-CCNC: 12 U/L
ANION GAP SERPL CALC-SCNC: 17 MMOL/L
APPEARANCE: CLEAR
AST SERPL-CCNC: 13 U/L
BACTERIA UR CULT: ABNORMAL
BACTERIA: NEGATIVE
BASOPHILS # BLD AUTO: 0.01 K/UL
BASOPHILS NFR BLD AUTO: 0.2 %
BILIRUB SERPL-MCNC: 0.2 MG/DL
BILIRUBIN URINE: NEGATIVE
BLOOD URINE: NEGATIVE
BUN SERPL-MCNC: 61 MG/DL
CALCIUM SERPL-MCNC: 9.7 MG/DL
CHLORIDE SERPL-SCNC: 100 MMOL/L
CO2 SERPL-SCNC: 25 MMOL/L
COLOR: YELLOW
CORE LAB FLUID CYTOLOGY: NORMAL
CREAT SERPL-MCNC: 1.82 MG/DL
EOSINOPHIL # BLD AUTO: 0.11 K/UL
EOSINOPHIL NFR BLD AUTO: 1.9 %
GLUCOSE QUALITATIVE U: NEGATIVE MG/DL
GLUCOSE SERPL-MCNC: 180 MG/DL
HBA1C MFR BLD HPLC: 7.2 %
HCT VFR BLD CALC: 33.5 %
HGB BLD-MCNC: 10.1 G/DL
HYALINE CASTS: 3 /LPF
IMM GRANULOCYTES NFR BLD AUTO: 0.2 %
KETONES URINE: NEGATIVE
LEUKOCYTE ESTERASE URINE: NEGATIVE
LYMPHOCYTES # BLD AUTO: 1.9 K/UL
LYMPHOCYTES NFR BLD AUTO: 32.4 %
MAN DIFF?: NORMAL
MCHC RBC-ENTMCNC: 28.5 PG
MCHC RBC-ENTMCNC: 30.1 GM/DL
MCV RBC AUTO: 94.6 FL
MICROSCOPIC-UA: NORMAL
MONOCYTES # BLD AUTO: 0.27 K/UL
MONOCYTES NFR BLD AUTO: 4.6 %
NEUTROPHILS # BLD AUTO: 3.57 K/UL
NEUTROPHILS NFR BLD AUTO: 60.7 %
NITRITE URINE: NEGATIVE
PH URINE: 5.5
PLATELET # BLD AUTO: 217 K/UL
POTASSIUM SERPL-SCNC: 4.9 MMOL/L
PROT SERPL-MCNC: 7.6 G/DL
PROTEIN URINE: NEGATIVE MG/DL
PSA SERPL-MCNC: <0.01 NG/ML
RBC # BLD: 3.54 M/UL
RBC # FLD: 16 %
RED BLOOD CELLS URINE: 1 /HPF
SODIUM SERPL-SCNC: 142 MMOL/L
SPECIFIC GRAVITY URINE: 1.02
SQUAMOUS EPITHELIAL CELLS: 0 /HPF
TESTOST SERPL-MCNC: 15.3 NG/DL
UROBILINOGEN URINE: NEGATIVE MG/DL
WBC # FLD AUTO: 5.87 K/UL
WHITE BLOOD CELLS URINE: 2 /HPF

## 2018-08-29 ENCOUNTER — RECORD ABSTRACTING (OUTPATIENT)
Age: 78
End: 2018-08-29

## 2018-08-29 DIAGNOSIS — Z72.3 LACK OF PHYSICAL EXERCISE: ICD-10-CM

## 2018-08-29 DIAGNOSIS — Z83.49 FAMILY HISTORY OF OTHER ENDOCRINE, NUTRITIONAL AND METABOLIC DISEASES: ICD-10-CM

## 2018-08-29 DIAGNOSIS — Z82.3 FAMILY HISTORY OF STROKE: ICD-10-CM

## 2018-08-29 DIAGNOSIS — Z82.49 FAMILY HISTORY OF ISCHEMIC HEART DISEASE AND OTHER DISEASES OF THE CIRCULATORY SYSTEM: ICD-10-CM

## 2018-08-29 DIAGNOSIS — Z87.442 PERSONAL HISTORY OF URINARY CALCULI: ICD-10-CM

## 2018-08-29 DIAGNOSIS — Z92.3 PERSONAL HISTORY OF IRRADIATION: ICD-10-CM

## 2018-08-29 LAB
HBA1C MFR BLD: 12.8
PODIATRY EVAL: NORMAL

## 2018-08-29 RX ORDER — HYDROCHLOROTHIAZIDE 12.5 MG/1
12.5 TABLET ORAL
Refills: 0 | Status: ACTIVE | COMMUNITY

## 2018-08-29 RX ORDER — PEN NEEDLE, DIABETIC 33 GX5/32"
NEEDLE, DISPOSABLE MISCELLANEOUS
Refills: 0 | Status: ACTIVE | COMMUNITY

## 2018-09-13 ENCOUNTER — APPOINTMENT (OUTPATIENT)
Dept: ENDOCRINOLOGY | Facility: CLINIC | Age: 78
End: 2018-09-13

## 2018-11-08 ENCOUNTER — OUTPATIENT (OUTPATIENT)
Dept: OUTPATIENT SERVICES | Facility: HOSPITAL | Age: 78
LOS: 1 days | End: 2018-11-08
Payer: MEDICARE

## 2018-11-08 ENCOUNTER — APPOINTMENT (OUTPATIENT)
Dept: UROLOGY | Facility: CLINIC | Age: 78
End: 2018-11-08
Payer: MEDICARE

## 2018-11-08 ENCOUNTER — RX RENEWAL (OUTPATIENT)
Age: 78
End: 2018-11-08

## 2018-11-08 VITALS
RESPIRATION RATE: 17 BRPM | TEMPERATURE: 98.7 F | BODY MASS INDEX: 38.65 KG/M2 | WEIGHT: 270 LBS | HEART RATE: 78 BPM | DIASTOLIC BLOOD PRESSURE: 76 MMHG | HEIGHT: 70 IN | SYSTOLIC BLOOD PRESSURE: 178 MMHG

## 2018-11-08 DIAGNOSIS — G56.00 CARPAL TUNNEL SYNDROME, UNSPECIFIED UPPER LIMB: Chronic | ICD-10-CM

## 2018-11-08 DIAGNOSIS — R35.1 NOCTURIA: ICD-10-CM

## 2018-11-08 DIAGNOSIS — R35.0 FREQUENCY OF MICTURITION: ICD-10-CM

## 2018-11-08 DIAGNOSIS — Z91.89 OTHER SPECIFIED PERSONAL RISK FACTORS, NOT ELSEWHERE CLASSIFIED: ICD-10-CM

## 2018-11-08 DIAGNOSIS — Z87.448 PERSONAL HISTORY OF OTHER DISEASES OF URINARY SYSTEM: Chronic | ICD-10-CM

## 2018-11-08 PROCEDURE — 96402 CHEMO HORMON ANTINEOPL SQ/IM: CPT

## 2018-11-08 PROCEDURE — 99214 OFFICE O/P EST MOD 30 MIN: CPT

## 2018-11-08 RX ORDER — SULFAMETHOXAZOLE AND TRIMETHOPRIM 800; 160 MG/1; MG/1
800-160 TABLET ORAL TWICE DAILY
Qty: 28 | Refills: 0 | Status: COMPLETED | COMMUNITY
Start: 2017-08-24 | End: 2018-11-08

## 2018-11-08 RX ORDER — LEUPROLIDE ACETATE 45 MG
45 KIT INTRAMUSCULAR
Qty: 1 | Refills: 0 | Status: COMPLETED | OUTPATIENT
Start: 2018-11-08

## 2018-11-08 RX ORDER — LEUPROLIDE ACETATE 45 MG
45 KIT INTRAMUSCULAR
Qty: 1 | Refills: 0 | Status: COMPLETED | OUTPATIENT
Start: 2018-08-09 | End: 2018-11-08

## 2018-11-08 RX ADMIN — LEUPROLIDE ACETATE 0 MG: KIT at 00:00

## 2018-11-09 ENCOUNTER — APPOINTMENT (OUTPATIENT)
Dept: ENDOCRINOLOGY | Facility: CLINIC | Age: 78
End: 2018-11-09
Payer: MEDICARE

## 2018-11-09 VITALS
BODY MASS INDEX: 37.03 KG/M2 | DIASTOLIC BLOOD PRESSURE: 80 MMHG | SYSTOLIC BLOOD PRESSURE: 140 MMHG | OXYGEN SATURATION: 98 % | HEART RATE: 73 BPM | WEIGHT: 250 LBS | HEIGHT: 69 IN

## 2018-11-09 DIAGNOSIS — R25.2 CRAMP AND SPASM: ICD-10-CM

## 2018-11-09 LAB — GLUCOSE BLDC GLUCOMTR-MCNC: 164

## 2018-11-09 PROCEDURE — 99214 OFFICE O/P EST MOD 30 MIN: CPT | Mod: 25

## 2018-11-09 PROCEDURE — 82962 GLUCOSE BLOOD TEST: CPT

## 2018-11-09 RX ORDER — SODIUM BICARBONATE 650 MG/1
650 TABLET ORAL TWICE DAILY
Qty: 360 | Refills: 3 | Status: DISCONTINUED | COMMUNITY
Start: 2017-09-06 | End: 2018-11-09

## 2018-11-09 RX ORDER — DESMOPRESSIN ACETATE 0.1 MG/1
0.1 TABLET ORAL
Qty: 30 | Refills: 11 | Status: DISCONTINUED | COMMUNITY
Start: 2018-11-08 | End: 2018-11-09

## 2018-11-09 RX ORDER — LEUPROLIDE ACETATE 45 MG
45 KIT INTRAMUSCULAR
Qty: 1 | Refills: 0 | Status: DISCONTINUED | OUTPATIENT
Start: 2017-11-09 | End: 2018-11-09

## 2018-11-09 RX ORDER — TAMSULOSIN HYDROCHLORIDE 0.4 MG/1
0.4 CAPSULE ORAL
Qty: 90 | Refills: 3 | Status: DISCONTINUED | COMMUNITY
Start: 2017-08-29 | End: 2018-11-09

## 2018-11-14 ENCOUNTER — RX RENEWAL (OUTPATIENT)
Age: 78
End: 2018-11-14

## 2018-11-15 ENCOUNTER — APPOINTMENT (OUTPATIENT)
Dept: UROLOGY | Facility: CLINIC | Age: 78
End: 2018-11-15

## 2018-11-15 LAB
ALBUMIN SERPL ELPH-MCNC: 4 G/DL
ALP BLD-CCNC: 49 U/L
ALT SERPL-CCNC: 17 U/L
ANION GAP SERPL CALC-SCNC: 14 MMOL/L
APPEARANCE: CLEAR
AST SERPL-CCNC: 13 U/L
BACTERIA UR CULT: ABNORMAL
BACTERIA: NEGATIVE
BASOPHILS # BLD AUTO: 0.01 K/UL
BASOPHILS NFR BLD AUTO: 0.2 %
BILIRUB SERPL-MCNC: 0.2 MG/DL
BILIRUBIN URINE: NEGATIVE
BLOOD URINE: NEGATIVE
BUN SERPL-MCNC: 59 MG/DL
CALCIUM SERPL-MCNC: 9.1 MG/DL
CHLORIDE SERPL-SCNC: 104 MMOL/L
CO2 SERPL-SCNC: 25 MMOL/L
COLOR: YELLOW
CORE LAB FLUID CYTOLOGY: NORMAL
CREAT SERPL-MCNC: 1.78 MG/DL
EOSINOPHIL # BLD AUTO: 0.1 K/UL
EOSINOPHIL NFR BLD AUTO: 2 %
GLUCOSE QUALITATIVE U: NEGATIVE MG/DL
GLUCOSE SERPL-MCNC: 140 MG/DL
HBA1C MFR BLD HPLC: 6.6 %
HCT VFR BLD CALC: 32.7 %
HGB BLD-MCNC: 10.4 G/DL
IMM GRANULOCYTES NFR BLD AUTO: 0.2 %
KETONES URINE: NEGATIVE
LEUKOCYTE ESTERASE URINE: NEGATIVE
LYMPHOCYTES # BLD AUTO: 1.83 K/UL
LYMPHOCYTES NFR BLD AUTO: 35.7 %
MAN DIFF?: NORMAL
MCHC RBC-ENTMCNC: 29.3 PG
MCHC RBC-ENTMCNC: 31.8 GM/DL
MCV RBC AUTO: 92.1 FL
MICROSCOPIC-UA: NORMAL
MONOCYTES # BLD AUTO: 0.26 K/UL
MONOCYTES NFR BLD AUTO: 5.1 %
NEUTROPHILS # BLD AUTO: 2.91 K/UL
NEUTROPHILS NFR BLD AUTO: 56.8 %
NITRITE URINE: NEGATIVE
PH URINE: 5.5
PLATELET # BLD AUTO: 173 K/UL
POTASSIUM SERPL-SCNC: 5.4 MMOL/L
PROT SERPL-MCNC: 7.3 G/DL
PROTEIN URINE: ABNORMAL MG/DL
PSA SERPL-MCNC: <0.01 NG/ML
RBC # BLD: 3.55 M/UL
RBC # FLD: 16.7 %
RED BLOOD CELLS URINE: 1 /HPF
SODIUM SERPL-SCNC: 143 MMOL/L
SPECIFIC GRAVITY URINE: 1.02
SQUAMOUS EPITHELIAL CELLS: 0 /HPF
TESTOST SERPL-MCNC: 7.9 NG/DL
UROBILINOGEN URINE: NEGATIVE MG/DL
WBC # FLD AUTO: 5.12 K/UL
WHITE BLOOD CELLS URINE: 2 /HPF

## 2018-11-15 RX ORDER — INSULIN GLARGINE 100 [IU]/ML
100 INJECTION, SOLUTION SUBCUTANEOUS AT BEDTIME
Qty: 5 | Refills: 1 | Status: DISCONTINUED | COMMUNITY
Start: 2018-11-09 | End: 2018-11-15

## 2018-11-20 DIAGNOSIS — R35.1 NOCTURIA: ICD-10-CM

## 2018-11-20 DIAGNOSIS — C61 MALIGNANT NEOPLASM OF PROSTATE: ICD-10-CM

## 2018-11-20 DIAGNOSIS — Z91.89 OTHER SPECIFIED PERSONAL RISK FACTORS, NOT ELSEWHERE CLASSIFIED: ICD-10-CM

## 2018-11-20 DIAGNOSIS — E11.9 TYPE 2 DIABETES MELLITUS WITHOUT COMPLICATIONS: ICD-10-CM

## 2018-11-21 ENCOUNTER — APPOINTMENT (OUTPATIENT)
Dept: UROLOGY | Facility: CLINIC | Age: 78
End: 2018-11-21

## 2018-11-28 ENCOUNTER — APPOINTMENT (OUTPATIENT)
Dept: ENDOCRINOLOGY | Facility: CLINIC | Age: 78
End: 2018-11-28

## 2018-12-10 ENCOUNTER — APPOINTMENT (OUTPATIENT)
Dept: ENDOCRINOLOGY | Facility: CLINIC | Age: 78
End: 2018-12-10
Payer: MEDICARE

## 2018-12-10 VITALS
HEIGHT: 69 IN | SYSTOLIC BLOOD PRESSURE: 120 MMHG | BODY MASS INDEX: 37.47 KG/M2 | HEART RATE: 78 BPM | DIASTOLIC BLOOD PRESSURE: 70 MMHG | WEIGHT: 253 LBS

## 2018-12-10 LAB — GLUCOSE BLDC GLUCOMTR-MCNC: 143

## 2018-12-10 PROCEDURE — 82962 GLUCOSE BLOOD TEST: CPT

## 2018-12-10 PROCEDURE — 99214 OFFICE O/P EST MOD 30 MIN: CPT | Mod: 25

## 2018-12-10 RX ORDER — INSULIN DEGLUDEC INJECTION 100 U/ML
100 INJECTION, SOLUTION SUBCUTANEOUS
Refills: 0 | Status: DISCONTINUED | COMMUNITY
End: 2018-12-10

## 2019-05-14 ENCOUNTER — APPOINTMENT (OUTPATIENT)
Dept: UROLOGY | Facility: CLINIC | Age: 79
End: 2019-05-14
Payer: MEDICARE

## 2019-05-14 ENCOUNTER — OUTPATIENT (OUTPATIENT)
Dept: OUTPATIENT SERVICES | Facility: HOSPITAL | Age: 79
LOS: 1 days | End: 2019-05-14
Payer: MEDICARE

## 2019-05-14 ENCOUNTER — RESULT REVIEW (OUTPATIENT)
Age: 79
End: 2019-05-14

## 2019-05-14 ENCOUNTER — MEDICATION RENEWAL (OUTPATIENT)
Age: 79
End: 2019-05-14

## 2019-05-14 DIAGNOSIS — G56.00 CARPAL TUNNEL SYNDROME, UNSPECIFIED UPPER LIMB: Chronic | ICD-10-CM

## 2019-05-14 DIAGNOSIS — R35.0 FREQUENCY OF MICTURITION: ICD-10-CM

## 2019-05-14 DIAGNOSIS — Z87.448 PERSONAL HISTORY OF OTHER DISEASES OF URINARY SYSTEM: Chronic | ICD-10-CM

## 2019-05-14 DIAGNOSIS — N28.1 CYST OF KIDNEY, ACQUIRED: ICD-10-CM

## 2019-05-14 PROCEDURE — 96401 CHEMO ANTI-NEOPL SQ/IM: CPT

## 2019-05-14 PROCEDURE — 99214 OFFICE O/P EST MOD 30 MIN: CPT

## 2019-05-14 RX ORDER — DENOSUMAB 60 MG/ML
60 INJECTION SUBCUTANEOUS
Qty: 1 | Refills: 0 | Status: COMPLETED | OUTPATIENT
Start: 2019-05-14 | End: 2019-05-14

## 2019-05-14 RX ORDER — DENOSUMAB 60 MG/ML
60 INJECTION SUBCUTANEOUS
Qty: 1 | Refills: 0 | Status: COMPLETED | OUTPATIENT
Start: 2019-05-14

## 2019-05-14 RX ORDER — LEUPROLIDE ACETATE 45 MG
45 KIT INTRAMUSCULAR
Qty: 1 | Refills: 0 | Status: DISCONTINUED | OUTPATIENT
Start: 2019-05-14 | End: 2019-05-14

## 2019-05-14 RX ADMIN — DENOSUMAB 0 MG/ML: 60 INJECTION SUBCUTANEOUS at 00:00

## 2019-05-14 NOTE — HISTORY OF PRESENT ILLNESS
[FreeTextEntry1] : Mr Thakur is a 78 year old man presented for follow up of prostate cancer and renal mass. The patient has prostate cancer, metastatic to bone, for which he is on androgen deprivation. The patient has an artificial urinary sphincter placed by Dr Hussein Munoz in 2015. CT of the abdomen and pelvis 05/26/16 found a left lower pole renal mass suspicious for neoplasm. There was a right lower pole renal calculus. Bone density scan 11/28/16 found low bone mass consistent with osteopenia. The patient takes bicalutamide 50 mg once daily.The patient is due for Lupron Depot 45 mg and Prolia 60 mg intramuscular injections today.  He reported that he has no bladder pain. However, at night he wakes up 4-5 times to urinate. During the day he urinates about 6 times\par 5/10/18: The patient returned for a Lupron injection. Patient reported he has extremely low vitality and fatigue which was progressively getting worse. Noted he can not walk 30 ft without getting exhausted. Creatinine from 4/28/18 was 2.37 mg/dL.  Noted he will be getting dental work and asked if he can hold off on Prolia. \par 8/9/18: The patient returned for a Prolia injection. Noted he is diabetic and is taking insulin. A1c from 5/10/18 was 12.8 %, noted he is seeing a endocrinologist and A1c has gone down since last visit. Lost 20 pounds. Urination was adequate. Patient denied dysuria, gross hematuria, urgency,or incontinence. Wakes up 3-4 times during the night to urinate. \par 11/08/18: The patient returned today for a Lupron and Prolia injection. Patient complained of nocturia. Wakes up 4-5 times during the night to urinate. PSA from 8/9/18 was undetectable. Patient denied dysuria, gross hematuria, urgency, or incontinence. I advised the patient to restart calcium supplements for bone health, I informed him that calcium supplements will not increase risk of causing uric acid kidney stones.\par \par 5/14/19: The patient returned today for a Prolia injection. The patient was also supposed to get a Lupron injection, but he has been having terrible reactions to Lupron. Lacks the senses of taste and smell. Has muscle weakness, loss of strength and testicular shrinkage. Complains of breast pain. Patient doesn't want to continue to live with these symptoms. The patient will not receive a Lupron injection. His urination is satisfactory with the artificial sphincter.

## 2019-05-14 NOTE — ADDENDUM
[FreeTextEntry1] : This note was authored by Caity Barrett working as a scribe for Dr. Ki Taylor.\par I, Dr. Ki Taylor, have reviewed the content of this note and confirm it is true and accurate. I personally performed the history and physical examination and made all the decisions.\par 5/14/19\par

## 2019-05-14 NOTE — REVIEW OF SYSTEMS
[Recent Weight Loss (___ Lbs)] : recent [unfilled] ~Ulb weight loss [Loss Of Hearing] : hearing loss [Nocturia] : nocturia [Feelings Of Weakness] : feelings of weakness [Muscle Weakness] : muscle weakness [Feeling Poorly] : not feeling poorly [Feeling Tired] : not feeling tired [Eyesight Problems] : no eyesight problems [Chest Pain] : no chest pain [Constipation] : no constipation [Dysuria] : no dysuria [Arthralgias] : no arthralgias [Joint Pain] : no joint pain [Easy Bleeding] : no tendency for easy bleeding [Easy Bruising] : no tendency for easy bruising

## 2019-05-14 NOTE — PHYSICAL EXAM
[General Appearance - Well Developed] : well developed [Normal Appearance] : normal appearance [Well Groomed] : well groomed [General Appearance - In No Acute Distress] : no acute distress [Abdomen Soft] : soft [Abdomen Tenderness] : non-tender [Costovertebral Angle Tenderness] : no ~M costovertebral angle tenderness [Skin Color & Pigmentation] : normal skin color and pigmentation [Heart Rate And Rhythm] : Heart rate and rhythm were normal [] : no respiratory distress [Exaggerated Use Of Accessory Muscles For Inspiration] : no accessory muscle use [Respiration, Rhythm And Depth] : normal respiratory rhythm and effort [Oriented To Time, Place, And Person] : oriented to person, place, and time [Affect] : the affect was normal [Mood] : the mood was normal [Not Anxious] : not anxious [Normal Station and Gait] : the gait and station were normal for the patient's age [No Focal Deficits] : no focal deficits [No Palpable Adenopathy] : no palpable adenopathy [Cervical Lymph Nodes Enlarged Posterior Bilaterally] : posterior cervical [Cervical Lymph Nodes Enlarged Anterior Bilaterally] : anterior cervical [Supraclavicular Lymph Nodes Enlarged Bilaterally] : supraclavicular [FreeTextEntry1] : No submandibular gland tenderness.\par

## 2019-05-14 NOTE — ASSESSMENT
[FreeTextEntry1] : Mr Thakur is a 78 year old man presented for follow up of prostate cancer and renal mass. The patient has prostate cancer, metastatic to bone, for which he is on androgen deprivation. The patient has an artificial urinary sphincter placed by Dr Hussein Munoz in 2015. CT of the abdomen and pelvis 05/26/16 found a left lower pole renal mass suspicious for neoplasm. There was a right lower pole renal calculus. Bone density scan 11/28/16 found low bone mass consistent with osteopenia. The patient takes bicalutamide 50 mg once daily.The patient is due for Lupron Depot 45 mg and Prolia 60 mg intramuscular injections today.  He reported that he has no bladder pain. However, at night he wakes up 4-5 times to urinate. During the day he urinates about 6 times\par 5/10/18: The patient returned for a Lupron injection. Patient reported he has extremely low vitality and fatigue which was progressively getting worse. Noted he can not walk 30 ft without getting exhausted. Creatinine from 4/28/18 was 2.37 mg/dL.  Noted he will be getting dental work and asked if he can hold off on Prolia. \par 8/9/18: The patient returned for a Prolia injection. Noted he is diabetic and is taking insulin. A1c from 5/10/18 was 12.8 %, noted he is seeing a endocrinologist and A1c has gone down since last visit. Lost 20 pounds. Urination was adequate. Patient denied dysuria, gross hematuria, urgency,or incontinence. Wakes up 3-4 times during the night to urinate. \par 11/08/18: The patient returned today for a Lupron and Prolia injection. Patient complained of nocturia. Wakes up 4-5 times during the night to urinate. PSA from 8/9/18 was undetectable. Patient denied dysuria, gross hematuria, urgency, or incontinence. I advised the patient to restart calcium supplements for bone health, I informed him that calcium supplements will not increase risk of causing uric acid kidney stones.\par \par 5/14/19: The patient returned today for a Prolia injection. The patient was also supposed to get a Lupron injection, but he has been having terrible reactions to Lupron. Lacks the senses of taste and smell. Has muscle weakness, loss of strength and testicular shrinkage. Complains of breast pain. Patient doesn't want to continue to live with these symptoms. The patient will not receive a Lupron injection. His urination is satisfactory with the artificial sphincter.\par \par The patient produced a urine sample which will be sent for urinalysis, urine cytology, and urine culture.\par Blood work today included hemoglobin A1c, comprehensive metabolic panel, CBC, vitamin D 1 25-dihydroxy, vitamin D 25-hydroxy and testosterone.\par \par The patient will return in 3 months for a follow up.

## 2019-05-16 ENCOUNTER — APPOINTMENT (OUTPATIENT)
Dept: ENDOCRINOLOGY | Facility: CLINIC | Age: 79
End: 2019-05-16
Payer: MEDICARE

## 2019-05-16 VITALS
HEART RATE: 80 BPM | BODY MASS INDEX: 38.21 KG/M2 | DIASTOLIC BLOOD PRESSURE: 70 MMHG | HEIGHT: 69 IN | SYSTOLIC BLOOD PRESSURE: 120 MMHG | WEIGHT: 258 LBS

## 2019-05-16 LAB — GLUCOSE BLDC GLUCOMTR-MCNC: 201

## 2019-05-16 PROCEDURE — 82962 GLUCOSE BLOOD TEST: CPT

## 2019-05-16 PROCEDURE — 99214 OFFICE O/P EST MOD 30 MIN: CPT | Mod: 25

## 2019-05-16 RX ORDER — LEUPROLIDE ACETATE 45 MG
45 KIT INTRAMUSCULAR
Qty: 1 | Refills: 0 | Status: DISCONTINUED | OUTPATIENT
Start: 2018-11-08 | End: 2019-05-16

## 2019-05-16 NOTE — REASON FOR VISIT
[Follow-Up: _____] : a [unfilled] follow-up visit [FreeTextEntry1] : Type 2 DM, Hyperlipidemia, and Vitamin D Deficiency

## 2019-05-16 NOTE — PHYSICAL EXAM
[Well Nourished] : well nourished [No Acute Distress] : no acute distress [Alert] : alert [Normal Sclera/Conjunctiva] : normal sclera/conjunctiva [Well Developed] : well developed [EOMI] : extra ocular movement intact [Normal Hearing] : hearing was normal [Supple] : the neck was supple [Thyroid Not Enlarged] : the thyroid was not enlarged [No LAD] : no lymphadenopathy [Normal Rate and Effort] : normal respiratory rhythm and effort [No Accessory Muscle Use] : no accessory muscle use [Clear to Auscultation] : lungs were clear to auscultation bilaterally [Normal Rate] : heart rate was normal  [Normal S1, S2] : normal S1 and S2 [Regular Rhythm] : with a regular rhythm [Pedal Pulses Normal] : the pedal pulses are present [No Edema] : there was no peripheral edema [Normal Bowel Sounds] : normal bowel sounds [Not Tender] : non-tender [Soft] : abdomen soft [Anterior Cervical Nodes] : anterior cervical nodes [Normal Gait] : normal gait [Acanthosis Nigricans] : no acanthosis nigricans [Normal Strength/Tone] : muscle strength and tone were normal [Right Foot Was Examined] : right foot ~C was examined [Left Foot Was Examined] : left foot ~C was examined [Delayed in the Right Toes] : normal in the toes [Delayed in the Left Toes] : normal in the toes [Normal] : normal [Diminished Throughout Both Feet] : normal tactile sensation with monofilament testing throughout both feet [No Tremors] : no tremors [Oriented x3] : oriented to person, place, and time [Normal Insight/Judgement] : insight and judgment were intact [Normal Mood] : the mood was normal [de-identified] : obese

## 2019-05-16 NOTE — ASSESSMENT
[FreeTextEntry1] : 77 y/o morbid obese male Type 2 DM, hyperlipidemia, and vitamin D deficiency. Labs on 5/10/19 - A1C 9.0, Cr 2.2,  chol 155 and vitamin D 29\par Pt. seen his nephrologist this week and reports Creatinine level is stable.\par \par Plan: \par Type 2 DM: A1C worsening - requiring close monitoring \par - increase Levemir to 20 units at bedtime and 4 units in am\par - continue Glimepiride 4 mg BID\par - continue self monitoring BID\par - send in logs in 1 week\par - bring meter to next visit\par \par Hyperlipidemia: monitor lipids\par \par Morbid obesity:encouraged routine exercise\par -  educated on healthy food choices\par \par Vitamin D deficiency: monitor labs, start vitamin D 2000 units daily \par  \par Follow up visit in 1 month

## 2019-05-16 NOTE — HISTORY OF PRESENT ILLNESS
[FreeTextEntry1] : Pt. reports his oncologist d/c Lupron due to increase symptoms of fatigue and depression. Since stopping Lupron pt. reports that he is feeling better. \par \par Quality: Type 2 DM\par Severity: moderate\par Duration: 1998\par Onset: routine labs\par Associated Symptoms: Nephropathy\par \par Current Regimen: \par Levemir 18 units at bedtime and 4 units in am \par Glimepiride 4 mg BID \par hx of Metformin use but no longer r/t CKD3\par \par self BS monitoring twice a day, no meter, no logs\par per pt. average glucose 170\par no hypoglycemic symptoms\par \par exercise: none\par \par Diet:\par B- eggs, ramirez, English muffin\par L- cold cut sandwich, whole wheat rolls\par D- limit carbs, meat, vegetables\par Snacks - sugar free Jello, chips\par \par Date of last eye exam: 9/2018 (-) diabetic retinopathy \par Date of last foot exam: in office only\par Date of last flu vaccine: 9/2018\par Date of Pneumovax: refused

## 2019-05-18 LAB
TESTOST BND SERPL-MCNC: 3 PG/ML
TESTOST SERPL-MCNC: 11.6 NG/DL

## 2019-05-19 LAB
24R-OH-CALCIDIOL SERPL-MCNC: 22.9 PG/ML
25(OH)D3 SERPL-MCNC: 36.6 NG/ML
ALBUMIN SERPL ELPH-MCNC: 3.8 G/DL
ALP BLD-CCNC: 68 U/L
ALT SERPL-CCNC: 12 U/L
ANION GAP SERPL CALC-SCNC: 14 MMOL/L
APPEARANCE: CLEAR
AST SERPL-CCNC: 10 U/L
BACTERIA UR CULT: NORMAL
BACTERIA: NEGATIVE
BASOPHILS # BLD AUTO: 0.01 K/UL
BASOPHILS NFR BLD AUTO: 0.2 %
BILIRUB SERPL-MCNC: 0.3 MG/DL
BILIRUBIN URINE: NEGATIVE
BLOOD URINE: NEGATIVE
BUN SERPL-MCNC: 41 MG/DL
CALCIUM SERPL-MCNC: 10 MG/DL
CHLORIDE SERPL-SCNC: 100 MMOL/L
CO2 SERPL-SCNC: 27 MMOL/L
COLOR: NORMAL
CREAT SERPL-MCNC: 1.71 MG/DL
EOSINOPHIL # BLD AUTO: 0.12 K/UL
EOSINOPHIL NFR BLD AUTO: 2.2 %
ESTIMATED AVERAGE GLUCOSE: 212 MG/DL
GLUCOSE QUALITATIVE U: NEGATIVE
GLUCOSE SERPL-MCNC: 200 MG/DL
HBA1C MFR BLD HPLC: 9 %
HCT VFR BLD CALC: 33.4 %
HGB BLD-MCNC: 10 G/DL
HYALINE CASTS: 0 /LPF
IMM GRANULOCYTES NFR BLD AUTO: 0.2 %
KETONES URINE: NEGATIVE
LEUKOCYTE ESTERASE URINE: NEGATIVE
LYMPHOCYTES # BLD AUTO: 1.51 K/UL
LYMPHOCYTES NFR BLD AUTO: 27.6 %
MAN DIFF?: NORMAL
MCHC RBC-ENTMCNC: 29.9 GM/DL
MCHC RBC-ENTMCNC: 30.2 PG
MCV RBC AUTO: 100.9 FL
MICROSCOPIC-UA: NORMAL
MONOCYTES # BLD AUTO: 0.33 K/UL
MONOCYTES NFR BLD AUTO: 6 %
NEUTROPHILS # BLD AUTO: 3.5 K/UL
NEUTROPHILS NFR BLD AUTO: 63.8 %
NITRITE URINE: NEGATIVE
PH URINE: 6
PLATELET # BLD AUTO: 190 K/UL
POTASSIUM SERPL-SCNC: 5.3 MMOL/L
PROT SERPL-MCNC: 7.3 G/DL
PROTEIN URINE: ABNORMAL
RBC # BLD: 3.31 M/UL
RBC # FLD: 14.9 %
RED BLOOD CELLS URINE: 1 /HPF
SODIUM SERPL-SCNC: 141 MMOL/L
SPECIFIC GRAVITY URINE: 1.02
SQUAMOUS EPITHELIAL CELLS: 1 /HPF
UROBILINOGEN URINE: NORMAL
WBC # FLD AUTO: 5.48 K/UL
WHITE BLOOD CELLS URINE: 3 /HPF

## 2019-05-20 DIAGNOSIS — N28.1 CYST OF KIDNEY, ACQUIRED: ICD-10-CM

## 2019-05-20 DIAGNOSIS — R53.81 OTHER MALAISE: ICD-10-CM

## 2019-05-20 DIAGNOSIS — E66.01 MORBID (SEVERE) OBESITY DUE TO EXCESS CALORIES: ICD-10-CM

## 2019-05-20 DIAGNOSIS — N39.41 URGE INCONTINENCE: ICD-10-CM

## 2019-05-20 DIAGNOSIS — N64.4 MASTODYNIA: ICD-10-CM

## 2019-05-20 DIAGNOSIS — C61 MALIGNANT NEOPLASM OF PROSTATE: ICD-10-CM

## 2019-06-17 LAB
CHOLEST SERPL-MCNC: 175
GLUCOSE SERPL-MCNC: 166
HBA1C MFR BLD HPLC: 8.4
HDLC SERPL-MCNC: 44
LDLC SERPL DIRECT ASSAY-MCNC: 105
MICROALBUMIN/CREAT UR-RTO: 201

## 2019-06-18 ENCOUNTER — APPOINTMENT (OUTPATIENT)
Dept: ENDOCRINOLOGY | Facility: CLINIC | Age: 79
End: 2019-06-18
Payer: MEDICARE

## 2019-06-18 VITALS
BODY MASS INDEX: 50.06 KG/M2 | OXYGEN SATURATION: 94 % | HEIGHT: 60 IN | WEIGHT: 255 LBS | DIASTOLIC BLOOD PRESSURE: 70 MMHG | HEART RATE: 65 BPM | SYSTOLIC BLOOD PRESSURE: 130 MMHG

## 2019-06-18 LAB — GLUCOSE BLDC GLUCOMTR-MCNC: 194

## 2019-06-18 PROCEDURE — 82962 GLUCOSE BLOOD TEST: CPT

## 2019-06-18 PROCEDURE — 99214 OFFICE O/P EST MOD 30 MIN: CPT | Mod: 25

## 2019-06-18 NOTE — ASSESSMENT
[FreeTextEntry1] : 80 y/o morbid obese male Type 2 DM, Hyperlipidemia and Vitamin D deficiency. Labs on 6/13/19 - A1C 8.4%, Cr 2.08, Potassium 5.5, BUN 36, GFR 30, Chol 178, , H/H 10.1/30.2 and Vitamin D 30\par \par Pt. reports he seen his nephrologist last month and states his Creatinine level is stable. Pt. states his is always anemic, potassium is always high, BUN is high, and GFR is low. Pt. will follow up with PCP and nephrology.  \par \par Plan: \par Type 2 DM: A1C improving  \par - increase Levemir to 22 units at bedtime and continue 4 units in am\par - continue Glimepiride 4 mg BID\par - continue self monitoring BID\par - send in logs in 1 week\par - bring meter to next visit\par \par Hyperlipidemia: monitor lipids, unable to tolerate statin due leg cramps\par \par Morbid obesity:encouraged routine exercise\par -  educated on healthy food choices\par \par Vitamin D deficiency: monitor labs, continue vitamin D 2000 units daily \par  \par Labs and follow up visit in 3 months. [Importance of Diet and Exercise] : importance of diet and exercise to improve glycemic control, achieve weight loss and improve cardiovascular health

## 2019-06-18 NOTE — HISTORY OF PRESENT ILLNESS
[FreeTextEntry1] : Quality: Type 2 DM\par Severity: moderate\par Duration: 1998\par Onset: routine labs\par Associated Symptoms: Nephropathy\par \par Current Regimen: \par Levemir 20 units at bedtime and 4 units in am \par Glimepiride 4 mg BID \par hx of Metformin use but no longer r/t CKD3\par \par self BS monitoring twice a day, per logs\par before breakfast: 165,156, 146, 213, 165\par after lunch:  171, 109, 171, 120, 112, 171\par after dinner: 170\par Pt. blood sugars decrease during the day after am dose of Levemir\par no hypoglycemic symptoms\par \par exercise: none\par \par Diet:\par B- eggs, ramirez, English muffin\par L- cold cut sandwich, whole wheat rolls\par D- limit carbs, meat, vegetables\par Snacks - sugar free Jello, chips\par \par Date of last eye exam: 9/2018 (-) diabetic retinopathy \par Date of last foot exam: in office only\par Date of last flu vaccine: 9/2018\par Date of Pneumovax: refused

## 2019-06-18 NOTE — PHYSICAL EXAM
[Alert] : alert [No Acute Distress] : no acute distress [Well Nourished] : well nourished [Well Developed] : well developed [Normal Sclera/Conjunctiva] : normal sclera/conjunctiva [EOMI] : extra ocular movement intact [Normal Hearing] : hearing was normal [Supple] : the neck was supple [No LAD] : no lymphadenopathy [Thyroid Not Enlarged] : the thyroid was not enlarged [Normal Rate and Effort] : normal respiratory rhythm and effort [No Accessory Muscle Use] : no accessory muscle use [Clear to Auscultation] : lungs were clear to auscultation bilaterally [Normal Rate] : heart rate was normal  [Normal S1, S2] : normal S1 and S2 [Regular Rhythm] : with a regular rhythm [Pedal Pulses Normal] : the pedal pulses are present [No Edema] : there was no peripheral edema [Normal Bowel Sounds] : normal bowel sounds [Not Tender] : non-tender [Soft] : abdomen soft [Anterior Cervical Nodes] : anterior cervical nodes [Normal Gait] : normal gait [Normal Strength/Tone] : muscle strength and tone were normal [Acanthosis Nigricans] : no acanthosis nigricans [Right Foot Was Examined] : right foot ~C was examined [Left Foot Was Examined] : left foot ~C was examined [Delayed in the Right Toes] : normal in the toes [Normal] : normal [Delayed in the Left Toes] : normal in the toes [Diminished Throughout Both Feet] : normal tactile sensation with monofilament testing throughout both feet [No Tremors] : no tremors [Oriented x3] : oriented to person, place, and time [Normal Insight/Judgement] : insight and judgment were intact [Normal Mood] : the mood was normal [de-identified] : obese

## 2019-06-18 NOTE — REVIEW OF SYSTEMS
[Fatigue] : fatigue [Recent Weight Loss (___ Lbs)] : recent [unfilled] ~Ulb weight loss [SOB on Exertion] : shortness of breath during exertion [Polyuria] : polyuria [Decreased Appetite] : appetite not decreased [Visual Field Defect] : no visual field defect [Blurry Vision] : no blurred vision [Dysphagia] : no dysphagia [Dysphonia] : no dysphonia [Neck Pain] : no neck pain [Chest Pain] : no chest pain [Palpitations] : no palpitations [Constipation] : no constipation [Diarrhea] : no diarrhea [Dysuria] : no dysuria [Headache] : no headaches [Tremors] : no tremors [Depression] : no depression [Anxiety] : no anxiety [Polydipsia] : no polydipsia [Cold Intolerance] : cold tolerant [Heat Intolerance] : heat tolerant [Easy Bruising] : no tendency for easy bruising [Swelling] : no swelling [FreeTextEntry2] : stopped Lupron  [FreeTextEntry8] : artificial ureter pump in place

## 2019-08-11 ENCOUNTER — TRANSCRIPTION ENCOUNTER (OUTPATIENT)
Age: 79
End: 2019-08-11

## 2019-08-14 ENCOUNTER — APPOINTMENT (OUTPATIENT)
Dept: UROLOGY | Facility: CLINIC | Age: 79
End: 2019-08-14
Payer: MEDICARE

## 2019-08-14 DIAGNOSIS — R53.83 OTHER MALAISE: ICD-10-CM

## 2019-08-14 DIAGNOSIS — R53.81 OTHER MALAISE: ICD-10-CM

## 2019-08-14 PROCEDURE — 99214 OFFICE O/P EST MOD 30 MIN: CPT

## 2019-08-15 ENCOUNTER — OTHER (OUTPATIENT)
Age: 79
End: 2019-08-15

## 2019-08-30 LAB
GLUCOSE SERPL-MCNC: 135
HBA1C MFR BLD HPLC: 7.5
MICROALBUMIN/CREAT 24H UR-RTO: 257

## 2019-09-03 ENCOUNTER — APPOINTMENT (OUTPATIENT)
Dept: ENDOCRINOLOGY | Facility: CLINIC | Age: 79
End: 2019-09-03
Payer: MEDICARE

## 2019-09-03 VITALS
OXYGEN SATURATION: 97 % | BODY MASS INDEX: 50.06 KG/M2 | HEIGHT: 60 IN | DIASTOLIC BLOOD PRESSURE: 68 MMHG | WEIGHT: 255 LBS | HEART RATE: 91 BPM | SYSTOLIC BLOOD PRESSURE: 120 MMHG

## 2019-09-03 DIAGNOSIS — D53.1 OTHER MEGALOBLASTIC ANEMIAS, NOT ELSEWHERE CLASSIFIED: ICD-10-CM

## 2019-09-03 LAB
ALBUMIN SERPL ELPH-MCNC: 3.9 G/DL
ALP BLD-CCNC: 60 U/L
ALT SERPL-CCNC: 11 U/L
ANION GAP SERPL CALC-SCNC: 10 MMOL/L
APPEARANCE: CLEAR
AST SERPL-CCNC: 14 U/L
BACTERIA UR CULT: ABNORMAL
BACTERIA: NEGATIVE
BASOPHILS # BLD AUTO: 0.01 K/UL
BASOPHILS NFR BLD AUTO: 0.2 %
BILIRUB SERPL-MCNC: 0.2 MG/DL
BILIRUBIN URINE: NEGATIVE
BLOOD URINE: NEGATIVE
BUN SERPL-MCNC: 62 MG/DL
CALCIUM SERPL-MCNC: 9.5 MG/DL
CHLORIDE SERPL-SCNC: 106 MMOL/L
CO2 SERPL-SCNC: 24 MMOL/L
COLOR: NORMAL
CREAT SERPL-MCNC: 1.98 MG/DL
EOSINOPHIL # BLD AUTO: 0.14 K/UL
EOSINOPHIL NFR BLD AUTO: 2.5 %
GLUCOSE BLDC GLUCOMTR-MCNC: 201
GLUCOSE QUALITATIVE U: NEGATIVE
GLUCOSE SERPL-MCNC: 137 MG/DL
HCT VFR BLD CALC: 33.2 %
HGB BLD-MCNC: 10 G/DL
HYALINE CASTS: 0 /LPF
IMM GRANULOCYTES NFR BLD AUTO: 0.4 %
KETONES URINE: NEGATIVE
LEUKOCYTE ESTERASE URINE: NEGATIVE
LYMPHOCYTES # BLD AUTO: 1.41 K/UL
LYMPHOCYTES NFR BLD AUTO: 24.7 %
MAN DIFF?: NORMAL
MCHC RBC-ENTMCNC: 30.1 GM/DL
MCHC RBC-ENTMCNC: 30.2 PG
MCV RBC AUTO: 100.3 FL
MICROSCOPIC-UA: NORMAL
MONOCYTES # BLD AUTO: 0.38 K/UL
MONOCYTES NFR BLD AUTO: 6.7 %
NEUTROPHILS # BLD AUTO: 3.75 K/UL
NEUTROPHILS NFR BLD AUTO: 65.5 %
NITRITE URINE: NEGATIVE
PH URINE: 5.5
PLATELET # BLD AUTO: 180 K/UL
POTASSIUM SERPL-SCNC: 4.9 MMOL/L
PROT SERPL-MCNC: 7.4 G/DL
PROTEIN URINE: NORMAL
PSA SERPL-MCNC: <0.01 NG/ML
RBC # BLD: 3.31 M/UL
RBC # FLD: 15.9 %
RED BLOOD CELLS URINE: 1 /HPF
SODIUM SERPL-SCNC: 140 MMOL/L
SPECIFIC GRAVITY URINE: 1.02
SQUAMOUS EPITHELIAL CELLS: 1 /HPF
TESTOST BND SERPL-MCNC: 3.3 PG/ML
TESTOST SERPL-MCNC: 10.9 NG/DL
URINE CYTOLOGY: NORMAL
UROBILINOGEN URINE: NORMAL
WBC # FLD AUTO: 5.71 K/UL
WHITE BLOOD CELLS URINE: 4 /HPF

## 2019-09-03 PROCEDURE — 99214 OFFICE O/P EST MOD 30 MIN: CPT | Mod: 25

## 2019-09-03 PROCEDURE — 82962 GLUCOSE BLOOD TEST: CPT

## 2019-09-03 NOTE — REASON FOR VISIT
[Follow-Up: _____] : a [unfilled] follow-up visit [FreeTextEntry1] :  Type 2 DM, Hyperlipidemia and Vitamin D deficiency

## 2019-09-03 NOTE — HISTORY OF PRESENT ILLNESS
[FreeTextEntry1] : Quality: Type 2 DM\par Severity: moderate\par Duration: 1998\par Onset: routine labs\par Associated Symptoms: Nephropathy\par \par Current Regimen: \par Levemir 22 units at bedtime and 4 units in am \par Glimepiride 4 mg BID \par hx of Metformin use but no longer r/t CKD3\par \par self BS monitoring twice a day, per logs\par before breakfast: 167, 127, 165, 166,155, 110\par after lunch:  160\par before dinner: 139, 140, 155, 136, 131, 146 \par  today - ramirez, eggs on sandwich with coffee this morning \par no hypoglycemic symptoms\par \par exercise: none\par \par Diet: same \par B- eggs, ramirez, English muffin\par L- cold cut sandwich, whole wheat rolls\par D- limit carbs, meat, vegetables\par Snacks - sugar free Jello, chips\par \par Date of last eye exam: 6/2019 (-) diabetic retinopathy \par Date of last foot exam: in office only\par Date of last flu vaccine: 9/2018\par Date of Pneumovax: refused

## 2019-09-03 NOTE — PHYSICAL EXAM
[Alert] : alert [No Acute Distress] : no acute distress [Well Nourished] : well nourished [Well Developed] : well developed [Normal Sclera/Conjunctiva] : normal sclera/conjunctiva [EOMI] : extra ocular movement intact [Normal Hearing] : hearing was normal [No LAD] : no lymphadenopathy [Supple] : the neck was supple [Thyroid Not Enlarged] : the thyroid was not enlarged [Normal Rate and Effort] : normal respiratory rhythm and effort [No Accessory Muscle Use] : no accessory muscle use [Clear to Auscultation] : lungs were clear to auscultation bilaterally [Normal Rate] : heart rate was normal  [Normal S1, S2] : normal S1 and S2 [Regular Rhythm] : with a regular rhythm [Pedal Pulses Normal] : the pedal pulses are present [No Edema] : there was no peripheral edema [Normal Bowel Sounds] : normal bowel sounds [Not Tender] : non-tender [Soft] : abdomen soft [Anterior Cervical Nodes] : anterior cervical nodes [Normal Gait] : normal gait [No Rash] : no rash [Normal Strength/Tone] : muscle strength and tone were normal [Right Foot Was Examined] : right foot ~C was examined [Left Foot Was Examined] : left foot ~C was examined [Normal] : normal [No Tremors] : no tremors [Oriented x3] : oriented to person, place, and time [Normal Insight/Judgement] : insight and judgment were intact [Normal Mood] : the mood was normal [Acanthosis Nigricans] : no acanthosis nigricans [Delayed in the Right Toes] : normal in the toes [Delayed in the Left Toes] : normal in the toes [Diminished Throughout Both Feet] : normal tactile sensation with monofilament testing throughout both feet [de-identified] : obese

## 2019-09-03 NOTE — ASSESSMENT
[Importance of Diet and Exercise] : importance of diet and exercise to improve glycemic control, achieve weight loss and improve cardiovascular health [FreeTextEntry1] : 78 y/o morbid obese male Type 2 DM, Hyperlipidemia and Vitamin D deficiency. Labs on 8/28/19 - A1C 7.5%, Cr 1.72, Potassium 5.4, BUN 27, GFR 38, TSH 1.1, Chol 170, , H/H 10.2/31.1 and Vitamin D 33\par \par Pt. reports he is followed by nephrologist r/t elevated Creatinine and BUN. Pt. states he aware of anemia and he had an iron infusion 3 weeks ago. Pt. reports potassium is always high and GFR is low. Pt. refused to have labs drawn today to check potassium level.\par \par Plan: \par Type 2 DM: A1C improving - pt. is not a candidate for tight glycemic control based on advanced age.\par - continue Levemir to 22 units at bedtime and continue 4 units in am and Glimepiride 4 mg BID\par - continue self monitoring BID\par - send in logs in 1 week\par - bring meter to next visit\par \par Hyperlipidemia: monitor lipids, unable to tolerate statin due leg cramps\par \par Morbid obesity:encouraged routine exercise\par -  educated on healthy food choices\par \par Vitamin D deficiency: monitor labs, continue vitamin D 1000 units daily \par  \par Labs and follow up visit in 3 months.

## 2019-09-04 ENCOUNTER — APPOINTMENT (OUTPATIENT)
Dept: NUCLEAR MEDICINE | Facility: CLINIC | Age: 79
End: 2019-09-04
Payer: MEDICARE

## 2019-09-04 PROCEDURE — 78320: CPT

## 2019-09-04 PROCEDURE — A9503A: CUSTOM

## 2019-10-28 ENCOUNTER — MEDICATION RENEWAL (OUTPATIENT)
Age: 79
End: 2019-10-28

## 2019-12-09 ENCOUNTER — APPOINTMENT (OUTPATIENT)
Dept: ENDOCRINOLOGY | Facility: CLINIC | Age: 79
End: 2019-12-09
Payer: MEDICARE

## 2019-12-09 VITALS
WEIGHT: 256 LBS | HEART RATE: 77 BPM | DIASTOLIC BLOOD PRESSURE: 62 MMHG | HEIGHT: 69 IN | BODY MASS INDEX: 37.92 KG/M2 | SYSTOLIC BLOOD PRESSURE: 114 MMHG

## 2019-12-09 LAB
HBA1C MFR BLD HPLC: 7.5
LDLC SERPL DIRECT ASSAY-MCNC: 76
MICROALBUMIN/CREAT 24H UR-RTO: 330

## 2019-12-09 PROCEDURE — 99214 OFFICE O/P EST MOD 30 MIN: CPT | Mod: 25

## 2019-12-09 PROCEDURE — 82962 GLUCOSE BLOOD TEST: CPT

## 2019-12-09 NOTE — REVIEW OF SYSTEMS
[Fatigue] : fatigue [Recent Weight Gain (___ Lbs)] : recent [unfilled] ~Ulb weight gain [Anxiety] : anxiety [Decreased Appetite] : appetite not decreased [Visual Field Defect] : no visual field defect [Blurry Vision] : no blurred vision [Dysphagia] : no dysphagia [Dysphonia] : no dysphonia [Neck Pain] : no neck pain [Chest Pain] : no chest pain [Palpitations] : no palpitations [Constipation] : no constipation [Diarrhea] : no diarrhea [Polyuria] : no polyuria [Headache] : no headaches [Dysuria] : no dysuria [Depression] : no depression [Polydipsia] : no polydipsia [Cold Intolerance] : cold tolerant [Heat Intolerance] : heat tolerant [Easy Bruising] : no tendency for easy bruising [Swelling] : no swelling

## 2019-12-09 NOTE — ASSESSMENT
[Importance of Diet and Exercise] : importance of diet and exercise to improve glycemic control, achieve weight loss and improve cardiovascular health [FreeTextEntry1] : 80 y/o morbid obese male Type 2 DM, Hyperlipidemia and Vitamin D deficiency. Labs on 12/5/19 - A1C 7.5%, Cr 1.8,  BUN 33, GFR 34, TSH 0.91, Chol 147, LDL 76, H/H 10.5/30.8 and Vitamin D 39\par \par Pt. reports he is followed by nephrologist r/t elevated Creatinine and BUN along with low GFR. Pt. states he aware of anemia and he had blood transfusion twice in the last 3 months.\par \par Plan: \par Type 2 DM: A1C stable - not a candidate for tight glycemic control based on advanced age.\par - continue Levemir to 22 units at bedtime and continue 4 units in am and Glimepiride 4 mg BID\par - continue self monitoring BID\par - send in logs in 1 week\par - bring meter to next visit\par \par Hyperlipidemia: monitor lipids, unable to tolerate statin due leg cramps\par \par Morbid obesity:encouraged routine exercise\par -  educated on healthy food choices - increase water intake to help kidney function \par \par Vitamin D deficiency: monitor labs, continue vitamin D 1000 units daily \par  \par Labs and follow up visit in 4-5 months after returning from [place].

## 2019-12-09 NOTE — HISTORY OF PRESENT ILLNESS
[FreeTextEntry1] : Quality: Type 2 DM\par Severity: moderate\par Duration: 1998\par Onset: routine labs\par Associated Symptoms: Nephropathy\par \par Current Regimen: \par Levemir 22 units at bedtime and 4 units in am \par Glimepiride 4 mg BID \par hx of Metformin use but no longer r/t CKD3\par \par self BS monitoring twice a day, no meter, no logs\par per pt. average blood sugars 150-160s\par  in office today\par no hypoglycemic symptoms\par \par exercise: none\par \par Diet: same \par B- eggs, ramirez, English muffin\par L- cold cut sandwich, whole wheat rolls\par D- limit carbs, meat, vegetables\par Snacks - sugar free Jello, chips\par \par Date of last eye exam: 6/2019 (-) diabetic retinopathy, appointment schedule in the next 2 weeks \par Date of last foot exam: in office only\par Date of last flu vaccine: 2019\par Date of Pneumovax: refused

## 2019-12-09 NOTE — PHYSICAL EXAM
[Alert] : alert [No Acute Distress] : no acute distress [Well Nourished] : well nourished [Well Developed] : well developed [Normal Sclera/Conjunctiva] : normal sclera/conjunctiva [EOMI] : extra ocular movement intact [Normal Hearing] : hearing was normal [Supple] : the neck was supple [No LAD] : no lymphadenopathy [Thyroid Not Enlarged] : the thyroid was not enlarged [Normal Rate and Effort] : normal respiratory rhythm and effort [No Accessory Muscle Use] : no accessory muscle use [Clear to Auscultation] : lungs were clear to auscultation bilaterally [Normal Rate] : heart rate was normal  [Normal S1, S2] : normal S1 and S2 [Regular Rhythm] : with a regular rhythm [Pedal Pulses Normal] : the pedal pulses are present [Normal Bowel Sounds] : normal bowel sounds [No Edema] : there was no peripheral edema [Soft] : abdomen soft [Not Tender] : non-tender [Anterior Cervical Nodes] : anterior cervical nodes [Normal Gait] : normal gait [Normal Strength/Tone] : muscle strength and tone were normal [No Rash] : no rash [Right Foot Was Examined] : right foot ~C was examined [Left Foot Was Examined] : left foot ~C was examined [Normal] : normal [No Motor Deficits] : the motor exam was normal [No Tremors] : no tremors [Oriented x3] : oriented to person, place, and time [Normal Insight/Judgement] : insight and judgment were intact [Normal Mood] : the mood was normal [Acanthosis Nigricans] : no acanthosis nigricans [Diminished Throughout Both Feet] : normal tactile sensation with monofilament testing throughout both feet [de-identified] : obese

## 2020-04-21 NOTE — PHYSICAL EXAM
[General Appearance - Well Developed] : well developed [Normal Appearance] : normal appearance [Well Groomed] : well groomed [General Appearance - In No Acute Distress] : no acute distress [Abdomen Soft] : soft [Abdomen Tenderness] : non-tender [Costovertebral Angle Tenderness] : no ~M costovertebral angle tenderness [Skin Color & Pigmentation] : normal skin color and pigmentation [Heart Rate And Rhythm] : Heart rate and rhythm were normal [] : no respiratory distress [Respiration, Rhythm And Depth] : normal respiratory rhythm and effort [Exaggerated Use Of Accessory Muscles For Inspiration] : no accessory muscle use [Oriented To Time, Place, And Person] : oriented to person, place, and time [Affect] : the affect was normal [Mood] : the mood was normal [Not Anxious] : not anxious [Normal Station and Gait] : the gait and station were normal for the patient's age [No Focal Deficits] : no focal deficits [No Palpable Adenopathy] : no palpable adenopathy [Cervical Lymph Nodes Enlarged Posterior Bilaterally] : posterior cervical [Cervical Lymph Nodes Enlarged Anterior Bilaterally] : anterior cervical [Supraclavicular Lymph Nodes Enlarged Bilaterally] : supraclavicular [FreeTextEntry1] : No submandibular gland tenderness.\par

## 2020-04-21 NOTE — ASSESSMENT
[FreeTextEntry1] : Mr Thakur is a 79 year old man presented for follow up of prostate cancer and renal mass. The patient has prostate cancer, metastatic to bone, for which he is on androgen deprivation. The patient has an artificial urinary sphincter placed by Dr Hussein Munoz in 2015. CT of the abdomen and pelvis 05/26/16 found a left lower pole renal mass suspicious for neoplasm. There was a right lower pole renal calculus. Bone density scan 11/28/16 found low bone mass consistent with osteopenia. The patient takes bicalutamide 50 mg once daily.The patient is due for Lupron Depot 45 mg and Prolia 60 mg intramuscular injections today.  He reported that he has no bladder pain. However, at night he wakes up 4-5 times to urinate. During the day he urinates about 6 times\par 5/10/18: The patient returned for a Lupron injection. Patient reported he has extremely low vitality and fatigue which was progressively getting worse. Noted he can not walk 30 ft without getting exhausted. Creatinine from 4/28/18 was 2.37 mg/dL.  Noted he will be getting dental work and asked if he can hold off on Prolia. \par 8/9/18: The patient returned for a Prolia injection. Noted he is diabetic and is taking insulin. A1c from 5/10/18 was 12.8 %, noted he is seeing a endocrinologist and A1c has gone down since last visit. Lost 20 pounds. Urination was adequate. Patient denied dysuria, gross hematuria, urgency,or incontinence. Wakes up 3-4 times during the night to urinate. \par 11/08/18: The patient returned today for a Lupron and Prolia injection. Patient complained of nocturia. Wakes up 4-5 times during the night to urinate. PSA from 8/9/18 was undetectable. Patient denied dysuria, gross hematuria, urgency, or incontinence. I advised the patient to restart calcium supplements for bone health, I informed him that calcium supplements will not increase risk of causing uric acid kidney stones.\par 5/14/19: The patient returned today for a Prolia injection. The patient was also supposed to get a Lupron injection, but he has been having terrible reactions to Lupron. Lacks the senses of taste and smell. Has muscle weakness, loss of strength and testicular shrinkage. Complains of breast pain. Patient doesn't want to continue to live with these symptoms. The patient will not receive a Lupron injection. His urination is satisfactory with the artificial sphincter.\par \par 8/14/19: Patient with hx of prostate CA with mets. Doing well off Lupron. States that he feels less depressed and has more energy since off Lupron. Urination pattern satisfactory. Will do blood work today to monitor PSA/Testosterone.\par \par Blood work today included comprehensive metabolic panel, CBC, PSA and testosterone. \par The patient produced a urine sample which will be sent for urinalysis, urine cytology and urine culture. \par \par A bone scan is ordered today. This is to monitor for metastatic disease as a result of the prostate cancer. He is to follow up once the scan is completed to discuss the results. \par \par He is to follow up for the Bone Scan as scheduled.

## 2020-04-21 NOTE — ADDENDUM
[FreeTextEntry1] : This note was authored by Marin Avilez working as scribe for Dr. Ki Taylor. I, Dr. Ki Taylor, have reviewed the content of this note and confirm it is true and accurate. I personally performed the history and physical examination and made all the decisions.\par 8/14/19

## 2020-04-21 NOTE — REVIEW OF SYSTEMS
[Recent Weight Loss (___ Lbs)] : recent [unfilled] ~Ulb weight loss [Loss Of Hearing] : hearing loss [Nocturia] : nocturia [Muscle Weakness] : muscle weakness [Feelings Of Weakness] : feelings of weakness [Feeling Poorly] : not feeling poorly [Feeling Tired] : not feeling tired [Eyesight Problems] : no eyesight problems [Chest Pain] : no chest pain [Constipation] : no constipation [Dysuria] : no dysuria [Arthralgias] : no arthralgias [Joint Pain] : no joint pain [Easy Bleeding] : no tendency for easy bleeding [Easy Bruising] : no tendency for easy bruising

## 2020-04-21 NOTE — HISTORY OF PRESENT ILLNESS
[FreeTextEntry1] : Mr Thakur is a 79 year old man presented for follow up of prostate cancer and renal mass. The patient has prostate cancer, metastatic to bone, for which he is on androgen deprivation. The patient has an artificial urinary sphincter placed by Dr Hussein Munoz in 2015. CT of the abdomen and pelvis 05/26/16 found a left lower pole renal mass suspicious for neoplasm. There was a right lower pole renal calculus. Bone density scan 11/28/16 found low bone mass consistent with osteopenia. The patient takes bicalutamide 50 mg once daily.The patient is due for Lupron Depot 45 mg and Prolia 60 mg intramuscular injections today.  He reported that he has no bladder pain. However, at night he wakes up 4-5 times to urinate. During the day he urinates about 6 times\par 5/10/18: The patient returned for a Lupron injection. Patient reported he has extremely low vitality and fatigue which was progressively getting worse. Noted he can not walk 30 ft without getting exhausted. Creatinine from 4/28/18 was 2.37 mg/dL.  Noted he will be getting dental work and asked if he can hold off on Prolia. \par 8/9/18: The patient returned for a Prolia injection. Noted he is diabetic and is taking insulin. A1c from 5/10/18 was 12.8 %, noted he is seeing a endocrinologist and A1c has gone down since last visit. Lost 20 pounds. Urination was adequate. Patient denied dysuria, gross hematuria, urgency,or incontinence. Wakes up 3-4 times during the night to urinate. \par 11/08/18: The patient returned today for a Lupron and Prolia injection. Patient complained of nocturia. Wakes up 4-5 times during the night to urinate. PSA from 8/9/18 was undetectable. Patient denied dysuria, gross hematuria, urgency, or incontinence. I advised the patient to restart calcium supplements for bone health, I informed him that calcium supplements will not increase risk of causing uric acid kidney stones.\par 5/14/19: The patient returned today for a Prolia injection. The patient was also supposed to get a Lupron injection, but he has been having terrible reactions to Lupron. Lacks the senses of taste and smell. Has muscle weakness, loss of strength and testicular shrinkage. Complains of breast pain. Patient doesn't want to continue to live with these symptoms. The patient will not receive a Lupron injection. His urination is satisfactory with the artificial sphincter.\par \par 8/14/19: Patient with hx of prostate CA with mets. Doing well off Lupron. States that he feels less depressed and has more energy since off Lupron. Urination pattern satisfactory. Will do blood work today to monitor PSA/Testosterone.

## 2020-04-22 ENCOUNTER — APPOINTMENT (OUTPATIENT)
Dept: UROLOGY | Facility: CLINIC | Age: 80
End: 2020-04-22
Payer: MEDICARE

## 2020-04-22 ENCOUNTER — APPOINTMENT (OUTPATIENT)
Dept: UROLOGY | Facility: CLINIC | Age: 80
End: 2020-04-22

## 2020-04-22 DIAGNOSIS — N20.0 CALCULUS OF KIDNEY: ICD-10-CM

## 2020-04-22 DIAGNOSIS — N64.4 MASTODYNIA: ICD-10-CM

## 2020-04-22 PROCEDURE — 99443: CPT

## 2020-04-23 NOTE — HISTORY OF PRESENT ILLNESS
[FreeTextEntry1] : Telephone visit was performed. Patient was in [place]. Complains of breast tenderness.\par \par The patient has prostate cancer initially treated with brachytherapy and external beam radiation about 21 years ago. The patient experienced irritative voiding symptoms and stress urinary incontinence.  He developed prostate cancer  bone metastasis for which  he was placed on androgen deprivation with Lupron and Bicalutamide. The patient has an artificial urinary sphincter placed by Dr Hussein Munoz in 2015. CT of the abdomen and pelvis 05/26/16 found a left lower pole renal mass suspicious for neoplasm. There was a right lower pole renal calculus. Bone density scan 11/28/16 found low bone mass consistent with osteopenia. Further examination found renal mass not to be neoplastic.\par \par Patient complained of depression, fatigue and malaise all of which substantially improved after stopping Lupron. November 8, 2018 was date of last Lupron injection. It was 45 mg which was anticipated to be effective for 6 months. Bicalutamide was discontinued about two years ago. August 14, 2019 PSA< 0.01; testosterone 10.9 ng/dl ([phone])\par \par Patient and wife spending winter in [place], [place]. Patient states urination is good with artificial urinary sphincter working wellaation showed no serious pathology. Cardiac evaluation including stress test and echocardiogram were negative for cardiac pathology. Patient experienced areola pain and tenderness  bilaterally centered behind nipples in small volume breast tissue. No hard nodules. No significant gynecomastia.\par \par Since stopping Lupron feels 75% improved. Has more vitality less somnolence..\par

## 2020-04-23 NOTE — PHYSICAL EXAM
[] : no respiratory distress [General Appearance - In No Acute Distress] : no acute distress [Oriented To Time, Place, And Person] : oriented to person, place, and time [Respiration, Rhythm And Depth] : normal respiratory rhythm and effort [Affect] : the affect was normal [Mood] : the mood was normal [Not Anxious] : not anxious

## 2020-04-23 NOTE — REVIEW OF SYSTEMS
[Feeling Poorly] : not feeling poorly [Feeling Tired] : not feeling tired [Abdominal Pain] : no abdominal pain [Dysuria] : no dysuria

## 2020-04-23 NOTE — ASSESSMENT
[FreeTextEntry1] : patient on intermittent hormone therapy currently with bilateral areola tenderness and pain. No increase in breast size or breast mass. will get hormone levels including testosterone, prolactin, LH, FSH, estradiol. depending on hiormone therapy results either breast exam in early may or June 2020.\par \par 30 minutes preparing for visit, conducting telephone visit, and coordinating care.

## 2020-04-30 ENCOUNTER — LABORATORY RESULT (OUTPATIENT)
Age: 80
End: 2020-04-30

## 2020-05-01 LAB
ALBUMIN SERPL ELPH-MCNC: 4.2 G/DL
ALP BLD-CCNC: 79 U/L
ALT SERPL-CCNC: 15 U/L
ANION GAP SERPL CALC-SCNC: 20 MMOL/L
APPEARANCE: CLEAR
AST SERPL-CCNC: 14 U/L
BASOPHILS # BLD AUTO: 0.02 K/UL
BASOPHILS NFR BLD AUTO: 0.4 %
BILIRUB SERPL-MCNC: 0.2 MG/DL
BILIRUBIN URINE: NEGATIVE
BLOOD URINE: NEGATIVE
BUN SERPL-MCNC: 64 MG/DL
CALCIUM SERPL-MCNC: 10.2 MG/DL
CHLORIDE SERPL-SCNC: 102 MMOL/L
CO2 SERPL-SCNC: 22 MMOL/L
COLOR: COLORLESS
CREAT SERPL-MCNC: 2.07 MG/DL
EOSINOPHIL # BLD AUTO: 0.16 K/UL
EOSINOPHIL NFR BLD AUTO: 2.9 %
ESTRADIOL SERPL-MCNC: <5 PG/ML
FSH SERPL-MCNC: 19.4 IU/L
GLUCOSE QUALITATIVE U: NEGATIVE
GLUCOSE SERPL-MCNC: 156 MG/DL
HCT VFR BLD CALC: 33.2 %
HGB BLD-MCNC: 10.2 G/DL
IMM GRANULOCYTES NFR BLD AUTO: 0.2 %
KETONES URINE: NEGATIVE
LEUKOCYTE ESTERASE URINE: NEGATIVE
LH SERPL-ACNC: 25.9 IU/L
LYMPHOCYTES # BLD AUTO: 1.95 K/UL
LYMPHOCYTES NFR BLD AUTO: 35.9 %
MAN DIFF?: NORMAL
MCHC RBC-ENTMCNC: 30.2 PG
MCHC RBC-ENTMCNC: 30.7 GM/DL
MCV RBC AUTO: 98.2 FL
MONOCYTES # BLD AUTO: 0.32 K/UL
MONOCYTES NFR BLD AUTO: 5.9 %
NEUTROPHILS # BLD AUTO: 2.97 K/UL
NEUTROPHILS NFR BLD AUTO: 54.7 %
NITRITE URINE: NEGATIVE
PH URINE: 6
PLATELET # BLD AUTO: 201 K/UL
POTASSIUM SERPL-SCNC: 5 MMOL/L
PROLACTIN SERPL-MCNC: 16.6 NG/ML
PROT SERPL-MCNC: 7.8 G/DL
PROTEIN URINE: ABNORMAL
PSA SERPL-MCNC: <0.01 NG/ML
RBC # BLD: 3.38 M/UL
RBC # FLD: 14.7 %
SODIUM SERPL-SCNC: 145 MMOL/L
SPECIFIC GRAVITY URINE: 1.01
TSH SERPL-ACNC: 1.62 UIU/ML
URINE CYTOLOGY: NORMAL
UROBILINOGEN URINE: NORMAL
WBC # FLD AUTO: 5.43 K/UL

## 2020-05-04 LAB
TESTOST BND SERPL-MCNC: 6.2 PG/ML
TESTOST SERPL-MCNC: 64.7 NG/DL

## 2020-05-11 ENCOUNTER — APPOINTMENT (OUTPATIENT)
Dept: ENDOCRINOLOGY | Facility: CLINIC | Age: 80
End: 2020-05-11
Payer: MEDICARE

## 2020-05-11 PROCEDURE — 99213 OFFICE O/P EST LOW 20 MIN: CPT | Mod: 95

## 2020-05-11 RX ORDER — CALCIUM ACETATE 667 MG/1
667 CAPSULE ORAL
Qty: 90 | Refills: 0 | Status: DISCONTINUED | COMMUNITY
Start: 2020-04-13

## 2020-05-11 RX ORDER — GLIMEPIRIDE 4 MG/1
4 TABLET ORAL
Refills: 0 | Status: DISCONTINUED | COMMUNITY
End: 2020-05-11

## 2020-05-11 RX ORDER — AMOXICILLIN AND CLAVULANATE POTASSIUM 875; 125 MG/1; MG/1
875-125 TABLET, COATED ORAL
Qty: 20 | Refills: 0 | Status: DISCONTINUED | COMMUNITY
Start: 2020-03-13

## 2020-05-11 RX ORDER — ROSUVASTATIN CALCIUM 10 MG/1
10 TABLET, FILM COATED ORAL
Qty: 30 | Refills: 0 | Status: DISCONTINUED | COMMUNITY
Start: 2020-04-13

## 2020-05-11 RX ORDER — BENZONATATE 100 MG/1
100 CAPSULE ORAL
Qty: 20 | Refills: 0 | Status: DISCONTINUED | COMMUNITY
Start: 2020-03-13

## 2020-05-11 RX ORDER — OMEPRAZOLE 40 MG/1
40 CAPSULE, DELAYED RELEASE ORAL
Qty: 30 | Refills: 0 | Status: DISCONTINUED | COMMUNITY
Start: 2020-03-24

## 2020-05-11 RX ORDER — REPAGLINIDE 1 MG/1
1 TABLET ORAL
Qty: 90 | Refills: 0 | Status: DISCONTINUED | COMMUNITY
Start: 2020-04-13

## 2020-05-11 NOTE — ASSESSMENT
[FreeTextEntry1] : 80 y/o morbid obese male Type 2 DM, Hyperlipidemia and Vitamin D deficiency. Labs reviewed from 5/8/20 - , A1C 7.3%, Creatinine 1.82, GFR 35, BUN 36, Microalbuminuria 292, cholesterol 136 and LDL 74. \par \par Plan: \par Type 2 DM: controlled \par - continue Levemir to 22 units at bedtime and continue 4 units in am and Repaglinide 1 mg before meals \par - continue self monitoring BID\par - send in logs in 1 week\par - bring meter to next visit\par \par Hyperlipidemia: controlled, continue Rosuvastatin 10 mg daily \par \par Morbid obesity:encouraged routine exercise\par -  educated on healthy food choices - increase water intake to help kidney function \par \par Vitamin D deficiency: monitor labs, continue vitamin D 1000 units daily \par \par Follow up with Nephrology r/t elevated Creatinine, Microalbuminuria, low GFR and elevated BUN. \par  \par Labs and follow up visit in 3 months.\par \par Start Time: 11:02 \par End Time: 11:20 [Importance of Diet and Exercise] : importance of diet and exercise to improve glycemic control, achieve weight loss and improve cardiovascular health

## 2020-05-11 NOTE — HISTORY OF PRESENT ILLNESS
[Medical Office: ([facility])___] : at the medical office located in  [Home] : at home, [unfilled] , at the time of the visit. [Patient] : the patient [Self] : self [FreeTextEntry1] : Recently hospitalized for chest pain in [place] and was told pain was r/t Lupron use in the past. No cardiac implications. \par \par Quality: Type 2 DM\par Severity: moderate\par Duration: 1998\par Onset: routine labs\par Associated Symptoms: Nephropathy\par \par Current Regimen: \par Levemir 22 units at bedtime and 4 units in am \par Repaglinide 1 mg before meals \par - Cardiologist stopped due Glimepiride 4 mg BID \par hx of Metformin use but no longer r/t CKD3\par \par self BS monitoring twice a day, no meter, no logs\par per pt. average blood sugars 130s \par no hypoglycemic symptoms\par \par exercise: none\par \par Diet: back on diet \par Loss 4 pounds \par \par Date of last eye exam: 12/2019 (-) diabetic retinopathy, appointment schedule in the next month. \par Date of last foot exam: in office only\par Date of last flu vaccine: 2019\par Date of Pneumovax: refused

## 2020-05-11 NOTE — PHYSICAL EXAM
[Alert] : alert [No Acute Distress] : no acute distress [de-identified] : Physical Exam deferred due to telehealth

## 2020-08-10 ENCOUNTER — APPOINTMENT (OUTPATIENT)
Dept: ENDOCRINOLOGY | Facility: CLINIC | Age: 80
End: 2020-08-10
Payer: MEDICARE

## 2020-08-10 VITALS
HEIGHT: 69 IN | WEIGHT: 261 LBS | BODY MASS INDEX: 38.66 KG/M2 | DIASTOLIC BLOOD PRESSURE: 66 MMHG | SYSTOLIC BLOOD PRESSURE: 120 MMHG | HEART RATE: 60 BPM

## 2020-08-10 LAB
GLUCOSE BLDC GLUCOMTR-MCNC: 149
HBA1C MFR BLD HPLC: 6.9
LDLC SERPL CALC-MCNC: 85

## 2020-08-10 PROCEDURE — 82962 GLUCOSE BLOOD TEST: CPT

## 2020-08-10 PROCEDURE — 99214 OFFICE O/P EST MOD 30 MIN: CPT | Mod: 25

## 2020-08-10 RX ORDER — GLIMEPIRIDE 4 MG/1
4 TABLET ORAL TWICE DAILY
Refills: 0 | Status: DISCONTINUED | COMMUNITY

## 2020-08-10 RX ORDER — REPAGLINIDE 1 MG/1
1 TABLET ORAL
Refills: 0 | Status: DISCONTINUED | COMMUNITY
End: 2020-08-10

## 2020-08-10 RX ORDER — ASPIRIN 325 MG
600-400 TABLET, DELAYED RELEASE (ENTERIC COATED) ORAL
Refills: 0 | Status: DISCONTINUED | COMMUNITY
Start: 2017-05-18 | End: 2020-08-10

## 2020-08-10 NOTE — PHYSICAL EXAM
[Alert] : alert [Well Nourished] : well nourished [No Acute Distress] : no acute distress [Normal Sclera/Conjunctiva] : normal sclera/conjunctiva [Well Developed] : well developed [No LAD] : no lymphadenopathy [EOMI] : extra ocular movement intact [Thyroid Not Enlarged] : the thyroid was not enlarged [No Thyroid Nodules] : no palpable thyroid nodules [No Respiratory Distress] : no respiratory distress [Normal Rate and Effort] : normal respiratory rate and effort [Clear to Auscultation] : lungs were clear to auscultation bilaterally [Normal S1, S2] : normal S1 and S2 [Normal Rate] : heart rate was normal [Regular Rhythm] : with a regular rhythm [Pedal Pulses Normal] : the pedal pulses are present [No Edema] : no peripheral edema [Not Tender] : non-tender [Normal Bowel Sounds] : normal bowel sounds [Soft] : abdomen soft [No Rash] : no rash [Normal Gait] : normal gait [Foot Ulcers] : no foot ulcers [Acanthosis Nigricans] : no acanthosis nigricans [Left Foot Was Examined] : left foot ~C was examined [Right Foot Was Examined] : right foot ~C was examined [Diminished Throughout Both Feet] : normal tactile sensation with monofilament testing throughout both feet [No Tremors] : no tremors [Normal] : normal [Normal Insight/Judgement] : insight and judgment were intact [Oriented x3] : oriented to person, place, and time [Normal Mood] : the mood was normal [de-identified] : obese

## 2020-08-10 NOTE — ASSESSMENT
[FreeTextEntry1] : 81 y/o morbid obese male Type 2 DM, Hyperlipidemia and Vitamin D deficiency. Labs reviewed from 8/4/20 - , A1C 6.9%, Creatinine 1.80, GFR 35, BUN 51,cholesterol 151, LDL 85, H/H 9.5/29.5. \par \par Plan: \par Type 2 DM: controlled \par - continue Levemir to 22 units at bedtime and continue 4 units in am\par - increase self BG monitoring to 1-2 times a day \par - send in logs in 1 week\par - bring meter to next visit\par - continue to follow up with ophthalmology due to vision changes \par \par Hyperlipidemia: controlled, continue Rosuvastatin\par \par Morbid obesity:encouraged routine exercise\par -  educated on healthy food choices - increase water intake to help kidney function \par \par Vitamin D deficiency: resolved \par \par Follow up with Nephrology r/t elevated Creatinine, Microalbuminuria, low GFR and elevated BUN. Pt. has appointment this month with Nephrology. \par  \par Labs and follow up visit in 3 months. [Importance of Diet and Exercise] : importance of diet and exercise to improve glycemic control, achieve weight loss and improve cardiovascular health

## 2020-08-10 NOTE — REVIEW OF SYSTEMS
[Recent Weight Gain (___ Lbs)] : recent weight gain: [unfilled] lbs [Visual Field Defect] : visual field defect [Dysuria] : dysuria [Anxiety] : anxiety [Fatigue] : no fatigue [Decreased Appetite] : appetite not decreased [Blurred Vision] : no blurred vision [Neck Pain] : no neck pain [Dysphagia] : no dysphagia [Chest Pain] : no chest pain [Dysphonia] : no dysphonia [Palpitations] : no palpitations [Constipation] : no constipation [Diarrhea] : no diarrhea [Polyuria] : no polyuria [Headaches] : no headaches [Tremors] : no tremors [Depression] : no depression [Polydipsia] : no polydipsia [Cold Intolerance] : no cold intolerance [Swelling] : no swelling [Heat Intolerance] : no heat intolerance [FreeTextEntry8] : urine was recently tested for UTI  [FreeTextEntry3] : chronic vision changes - followed by ophthalmology

## 2020-08-10 NOTE — HISTORY OF PRESENT ILLNESS
[FreeTextEntry1] : Pt. reports that he recently had burning during urination and seen PCP - awaiting for urine results. In March 2020 he was hospitalized with chest pressure and was r/o for cardiac disease. Pt. was told the chest pressure was due to side of effect of receiving Lupron injection. He no longer receives Lupron injections. \par \par Quality: Type 2 DM\par Severity: moderate\par Duration: 1998\par Onset: routine labs\par Associated Symptoms: Nephropathy\par \par Current Regimen: \par Levemir 22 units at bedtime and 4 units in am \par - stopped Repaglinide 1 mg before meals \par - Cardiologist stopped due Glimepiride 4 mg BID \par hx of Metformin use but no longer r/t CKD3\par \par self BG monitoring not often, a couple times a week, no meter, no logs\par per pt. average blood sugars 145\par no hypoglycemic symptoms\par Current \par \par exercise: none\par \par Diet: watch carb intake \par Gained 5 pounds \par \par Date of last eye exam: 5/2020 (-) diabetic retinopathy, every 6 months  \par Date of last foot exam: in office only\par Date of last flu vaccine: 2019\par Date of Pneumovax: refused

## 2020-08-12 ENCOUNTER — APPOINTMENT (OUTPATIENT)
Dept: UROLOGY | Facility: CLINIC | Age: 80
End: 2020-08-12
Payer: MEDICARE

## 2020-08-12 ENCOUNTER — LABORATORY RESULT (OUTPATIENT)
Age: 80
End: 2020-08-12

## 2020-08-12 VITALS — HEART RATE: 79 BPM | DIASTOLIC BLOOD PRESSURE: 74 MMHG | SYSTOLIC BLOOD PRESSURE: 184 MMHG

## 2020-08-12 VITALS — TEMPERATURE: 97.9 F

## 2020-08-12 DIAGNOSIS — R35.0 FREQUENCY OF MICTURITION: ICD-10-CM

## 2020-08-12 PROCEDURE — 99214 OFFICE O/P EST MOD 30 MIN: CPT

## 2020-08-14 ENCOUNTER — APPOINTMENT (OUTPATIENT)
Dept: UROLOGY | Facility: CLINIC | Age: 80
End: 2020-08-14
Payer: MEDICARE

## 2020-08-14 VITALS
DIASTOLIC BLOOD PRESSURE: 78 MMHG | RESPIRATION RATE: 17 BRPM | HEART RATE: 74 BPM | TEMPERATURE: 97.7 F | SYSTOLIC BLOOD PRESSURE: 152 MMHG

## 2020-08-14 VITALS — TEMPERATURE: 97.7 F

## 2020-08-14 PROCEDURE — 99214 OFFICE O/P EST MOD 30 MIN: CPT

## 2020-08-14 NOTE — PHYSICAL EXAM
[General Appearance - Well Developed] : well developed [General Appearance - Well Nourished] : well nourished [Normal Appearance] : normal appearance [Well Groomed] : well groomed [General Appearance - In No Acute Distress] : no acute distress [] : no respiratory distress [Respiration, Rhythm And Depth] : normal respiratory rhythm and effort [Affect] : the affect was normal [Exaggerated Use Of Accessory Muscles For Inspiration] : no accessory muscle use [Oriented To Time, Place, And Person] : oriented to person, place, and time [Not Anxious] : not anxious [Mood] : the mood was normal

## 2020-08-17 NOTE — HISTORY OF PRESENT ILLNESS
[FreeTextEntry1] : Patient is a 81 yo M who presents for ~2 wks of difficulty voiding, weak stream, straining to empty.  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  He was also on CIC for roughly 1 yr.  He developed LALI thereafter and underwent AUS in 2015 with Dr Munoz.  Had done well until most recently.  Does still leak with certain valsalva/activity - sitting down.\par \par Recent UA negative for infection.  No fever/chills, gross hematuria.\par

## 2020-08-17 NOTE — ASSESSMENT
[FreeTextEntry1] : Patient is a 79 yo M who presents for obstructive urinary symptoms.  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  He was also on CIC for roughly 1 yr and is now s/p AUS insertion in 2015.\par \par -suspect there is recurrent of stricture\par -recent UA negative for infection\par -d/w pt that need to further assess his possible stricture, however due to prior AUS would be risk for damage to AUS to perform cystoscopy and possible intervention\par -d/w pt that could perform endoscopic procedure if stricture recurred, and to perform evaluation under anesthesia in OR in order to expedient intervention.  would need to deactivated device at the time\par -d/w pt that any endoscopic intervention would only be temporary and not potentially definitive\par -d/w pt that definitive intervention unclear, would need to first ascertain possible stricture characteristics\par -pt wishes to undergo OR\par -OR order placed

## 2020-08-20 ENCOUNTER — OUTPATIENT (OUTPATIENT)
Dept: OUTPATIENT SERVICES | Facility: HOSPITAL | Age: 80
LOS: 1 days | End: 2020-08-20
Payer: MEDICARE

## 2020-08-20 VITALS
HEIGHT: 70 IN | DIASTOLIC BLOOD PRESSURE: 78 MMHG | RESPIRATION RATE: 16 BRPM | WEIGHT: 255.07 LBS | SYSTOLIC BLOOD PRESSURE: 168 MMHG | TEMPERATURE: 98 F | HEART RATE: 76 BPM | OXYGEN SATURATION: 97 %

## 2020-08-20 DIAGNOSIS — N35.919 UNSPECIFIED URETHRAL STRICTURE, MALE, UNSPECIFIED SITE: ICD-10-CM

## 2020-08-20 DIAGNOSIS — G56.00 CARPAL TUNNEL SYNDROME, UNSPECIFIED UPPER LIMB: Chronic | ICD-10-CM

## 2020-08-20 DIAGNOSIS — T83.9XXA UNSPECIFIED COMPLICATION OF GENITOURINARY PROSTHETIC DEVICE, IMPLANT AND GRAFT, INITIAL ENCOUNTER: ICD-10-CM

## 2020-08-20 DIAGNOSIS — Z87.448 PERSONAL HISTORY OF OTHER DISEASES OF URINARY SYSTEM: Chronic | ICD-10-CM

## 2020-08-20 DIAGNOSIS — G47.33 OBSTRUCTIVE SLEEP APNEA (ADULT) (PEDIATRIC): ICD-10-CM

## 2020-08-20 DIAGNOSIS — Z01.818 ENCOUNTER FOR OTHER PREPROCEDURAL EXAMINATION: ICD-10-CM

## 2020-08-20 LAB
ANION GAP SERPL CALC-SCNC: 12 MMOL/L — SIGNIFICANT CHANGE UP (ref 5–17)
BUN SERPL-MCNC: 46 MG/DL — HIGH (ref 7–23)
CALCIUM SERPL-MCNC: 10 MG/DL — SIGNIFICANT CHANGE UP (ref 8.4–10.5)
CHLORIDE SERPL-SCNC: 103 MMOL/L — SIGNIFICANT CHANGE UP (ref 96–108)
CO2 SERPL-SCNC: 25 MMOL/L — SIGNIFICANT CHANGE UP (ref 22–31)
CREAT SERPL-MCNC: 2.06 MG/DL — HIGH (ref 0.5–1.3)
GLUCOSE SERPL-MCNC: 141 MG/DL — HIGH (ref 70–99)
HCT VFR BLD CALC: 33.7 % — LOW (ref 39–50)
HGB BLD-MCNC: 10.1 G/DL — LOW (ref 13–17)
MCHC RBC-ENTMCNC: 29.6 PG — SIGNIFICANT CHANGE UP (ref 27–34)
MCHC RBC-ENTMCNC: 30 GM/DL — LOW (ref 32–36)
MCV RBC AUTO: 98.8 FL — SIGNIFICANT CHANGE UP (ref 80–100)
NRBC # BLD: 0 /100 WBCS — SIGNIFICANT CHANGE UP (ref 0–0)
PLATELET # BLD AUTO: 185 K/UL — SIGNIFICANT CHANGE UP (ref 150–400)
POTASSIUM SERPL-MCNC: 4.8 MMOL/L — SIGNIFICANT CHANGE UP (ref 3.5–5.3)
POTASSIUM SERPL-SCNC: 4.8 MMOL/L — SIGNIFICANT CHANGE UP (ref 3.5–5.3)
RBC # BLD: 3.41 M/UL — LOW (ref 4.2–5.8)
RBC # FLD: 15 % — HIGH (ref 10.3–14.5)
SODIUM SERPL-SCNC: 140 MMOL/L — SIGNIFICANT CHANGE UP (ref 135–145)
WBC # BLD: 6.08 K/UL — SIGNIFICANT CHANGE UP (ref 3.8–10.5)
WBC # FLD AUTO: 6.08 K/UL — SIGNIFICANT CHANGE UP (ref 3.8–10.5)

## 2020-08-20 RX ORDER — CEFAZOLIN SODIUM 1 G
2000 VIAL (EA) INJECTION ONCE
Refills: 0 | Status: DISCONTINUED | OUTPATIENT
Start: 2020-08-27 | End: 2020-09-11

## 2020-08-20 RX ORDER — AZTREONAM 2 G
1 VIAL (EA) INJECTION
Qty: 0 | Refills: 0 | DISCHARGE

## 2020-08-20 RX ORDER — ASCORBIC ACID 60 MG
1 TABLET,CHEWABLE ORAL
Qty: 0 | Refills: 0 | DISCHARGE

## 2020-08-20 RX ORDER — FERROUS SULFATE 325(65) MG
0 TABLET ORAL
Qty: 0 | Refills: 0 | DISCHARGE

## 2020-08-20 RX ORDER — SIMVASTATIN 20 MG/1
1 TABLET, FILM COATED ORAL
Qty: 0 | Refills: 0 | DISCHARGE

## 2020-08-20 NOTE — H&P PST ADULT - HISTORY OF PRESENT ILLNESS
80 y.o. male  with h/o DM2 on insulin, HTN, obesity metastatic prostate cancer in 2011 treated with seeds, urethral stricture due to RT s/p artificial urinary sphincter in 2015 and multiple dilations c/o difficulty voiding and burning sensation with urination. Symptoms suspicious for a recurrent urethral stricture or artificial sphincter malfunction. He is scheduled for Cystoscopy, Retrograde Urethrogram, Internal Urethrotomy. 80 y.o. male  with h/o DM2 on insulin, HTN, obesity, metastatic prostate cancer in 2011 treated with seeds, urethral stricture due to RT s/p artificial urinary sphincter in 2015 and multiple dilations c/o difficulty voiding and burning sensation with urination. Symptoms suspicious for a recurrent urethral stricture or artificial sphincter malfunction. He is scheduled for Cystoscopy, Retrograde Urethrogram, Internal Urethrotomy.

## 2020-08-20 NOTE — H&P PST ADULT - NSICDXPASTSURGICALHX_GEN_ALL_CORE_FT
PAST SURGICAL HISTORY:  Carpal tunnel syndrome right wrist 2013    H/O malignant melanoma of skin right ear, excision of neoplasm 11/2011     H/O nephrostomy and Laser lithotripsy. 2017    History of Cystoscopy multiple urethral dilation, intravesical anesthetic    History of radiation therapy for prostate cancer 1998, brachytherapy    Lipoma resection from stomach in 1990     Macular Pucker Left surgery 6/2010

## 2020-08-20 NOTE — H&P PST ADULT - NSICDXPROBLEM_GEN_ALL_CORE_FT
PROBLEM DIAGNOSES  Problem: Urethral stricture  Assessment and Plan:  Cystoscopy, Retrograde Urethrogram, Internal Urethrotomy.   Medical clearance pending. FS on admission. HgA1C 7.2    Problem: BESSIE on CPAP  Assessment and Plan: OR booking notified.

## 2020-08-20 NOTE — H&P PST ADULT - LAST ECHOCARDIOGRAM
02/2020 - mid chest pain and swollen nipples. Cardiac causes ruled out, pain was reaction to Lupron as per patient, tests done in [facility] unknown 02/2020 - mid chest pain and swollen nipples. Cardiac causes ruled out, pain was reaction to Lupron as per patient, tests done in [facility] unknown. Patient doesn't see cardiologist, medical evaluation pending

## 2020-08-20 NOTE — H&P PST ADULT - GENERAL GENITOURINARY SYMPTOMS
artificial urinary sphincter in place with some leakage of urine; recent onset of difficulty urinating with burning pain

## 2020-08-20 NOTE — H&P PST ADULT - NSICDXPASTMEDICALHX_GEN_ALL_CORE_FT
PAST MEDICAL HISTORY:  Anemia     Calculus of kidney - Left, 2017    Diabetes Mellitus Type II on insulin    Glaucoma     H/O urethral stricture after RT of prostate Ca with seeds, undervent multiple dilation procedures    History of Prostate Cancer with mets to bones, dx 1998 s/p Brachytherapy and Lupron (stopped in 2018)    HTN (Hypertension)     Hyperlipidemia     Obese     Pneumonia 01/2013 treated with PO antibiotics    Seasonal allergies     Sleep apnea on CPAP    Smoker 1.5 PPD x 25 years quit 1977 PAST MEDICAL HISTORY:  Anemia     Calculus of kidney - Left, 2017    Diabetes Mellitus Type II on insulin    Glaucoma     H/O urethral stricture after RT of prostate Ca with seeds, underwent multiple dilations procedures    History of Prostate Cancer with mets to bones, dx 1998 s/p Brachytherapy and Lupron (stopped in 2018)    HTN (Hypertension)     Hyperlipidemia     Obese     Pneumonia 01/2013 treated with PO antibiotics    Seasonal allergies     Sleep apnea on CPAP    Smoker 1.5 PPD x 25 years quit 1977

## 2020-08-21 LAB
CULTURE RESULTS: SIGNIFICANT CHANGE UP
SPECIMEN SOURCE: SIGNIFICANT CHANGE UP

## 2020-08-24 ENCOUNTER — APPOINTMENT (OUTPATIENT)
Dept: DISASTER EMERGENCY | Facility: CLINIC | Age: 80
End: 2020-08-24

## 2020-08-24 DIAGNOSIS — Z01.818 ENCOUNTER FOR OTHER PREPROCEDURAL EXAMINATION: ICD-10-CM

## 2020-08-25 LAB — SARS-COV-2 N GENE NPH QL NAA+PROBE: NOT DETECTED

## 2020-08-26 ENCOUNTER — TRANSCRIPTION ENCOUNTER (OUTPATIENT)
Age: 80
End: 2020-08-26

## 2020-08-27 ENCOUNTER — OUTPATIENT (OUTPATIENT)
Dept: OUTPATIENT SERVICES | Facility: HOSPITAL | Age: 80
LOS: 1 days | End: 2020-08-27
Payer: MEDICARE

## 2020-08-27 ENCOUNTER — APPOINTMENT (OUTPATIENT)
Dept: UROLOGY | Facility: HOSPITAL | Age: 80
End: 2020-08-27

## 2020-08-27 VITALS
OXYGEN SATURATION: 96 % | HEART RATE: 85 BPM | SYSTOLIC BLOOD PRESSURE: 137 MMHG | RESPIRATION RATE: 18 BRPM | TEMPERATURE: 97 F | DIASTOLIC BLOOD PRESSURE: 75 MMHG

## 2020-08-27 VITALS
OXYGEN SATURATION: 95 % | RESPIRATION RATE: 18 BRPM | HEIGHT: 70 IN | TEMPERATURE: 98 F | DIASTOLIC BLOOD PRESSURE: 81 MMHG | WEIGHT: 255.07 LBS | HEART RATE: 72 BPM | SYSTOLIC BLOOD PRESSURE: 178 MMHG

## 2020-08-27 DIAGNOSIS — G56.00 CARPAL TUNNEL SYNDROME, UNSPECIFIED UPPER LIMB: Chronic | ICD-10-CM

## 2020-08-27 DIAGNOSIS — T83.9XXA UNSPECIFIED COMPLICATION OF GENITOURINARY PROSTHETIC DEVICE, IMPLANT AND GRAFT, INITIAL ENCOUNTER: ICD-10-CM

## 2020-08-27 DIAGNOSIS — Z87.448 PERSONAL HISTORY OF OTHER DISEASES OF URINARY SYSTEM: ICD-10-CM

## 2020-08-27 DIAGNOSIS — Z01.818 ENCOUNTER FOR OTHER PREPROCEDURAL EXAMINATION: ICD-10-CM

## 2020-08-27 DIAGNOSIS — Z87.448 PERSONAL HISTORY OF OTHER DISEASES OF URINARY SYSTEM: Chronic | ICD-10-CM

## 2020-08-27 PROBLEM — N20.0 CALCULUS OF KIDNEY: Chronic | Status: ACTIVE | Noted: 2017-08-21

## 2020-08-27 LAB — GLUCOSE BLDC GLUCOMTR-MCNC: 188 MG/DL — HIGH (ref 70–99)

## 2020-08-27 PROCEDURE — 87086 URINE CULTURE/COLONY COUNT: CPT

## 2020-08-27 PROCEDURE — G0463: CPT

## 2020-08-27 PROCEDURE — 52275 CYSTOSCOPY & REVISE URETHRA: CPT

## 2020-08-27 PROCEDURE — 82962 GLUCOSE BLOOD TEST: CPT

## 2020-08-27 PROCEDURE — 51610 INJECTION FOR BLADDER X-RAY: CPT

## 2020-08-27 PROCEDURE — 52276 CYSTOSCOPY AND TREATMENT: CPT

## 2020-08-27 PROCEDURE — C1758: CPT

## 2020-08-27 PROCEDURE — 80048 BASIC METABOLIC PNL TOTAL CA: CPT

## 2020-08-27 PROCEDURE — 76000 FLUOROSCOPY <1 HR PHYS/QHP: CPT

## 2020-08-27 PROCEDURE — 85027 COMPLETE CBC AUTOMATED: CPT

## 2020-08-27 PROCEDURE — C1889: CPT

## 2020-08-27 PROCEDURE — 52283 CYSTOSCOPY AND TREATMENT: CPT

## 2020-08-27 RX ORDER — HYDROMORPHONE HYDROCHLORIDE 2 MG/ML
0.25 INJECTION INTRAMUSCULAR; INTRAVENOUS; SUBCUTANEOUS
Refills: 0 | Status: DISCONTINUED | OUTPATIENT
Start: 2020-08-27 | End: 2020-08-27

## 2020-08-27 RX ORDER — LIDOCAINE HCL 20 MG/ML
0.2 VIAL (ML) INJECTION ONCE
Refills: 0 | Status: DISCONTINUED | OUTPATIENT
Start: 2020-08-27 | End: 2020-08-27

## 2020-08-27 RX ORDER — SODIUM CHLORIDE 9 MG/ML
3 INJECTION INTRAMUSCULAR; INTRAVENOUS; SUBCUTANEOUS EVERY 8 HOURS
Refills: 0 | Status: DISCONTINUED | OUTPATIENT
Start: 2020-08-27 | End: 2020-08-27

## 2020-08-27 RX ORDER — CEPHALEXIN 500 MG
1 CAPSULE ORAL
Qty: 2 | Refills: 0
Start: 2020-08-27 | End: 2020-08-27

## 2020-08-27 RX ORDER — ONDANSETRON 8 MG/1
4 TABLET, FILM COATED ORAL ONCE
Refills: 0 | Status: DISCONTINUED | OUTPATIENT
Start: 2020-08-27 | End: 2020-08-27

## 2020-08-27 NOTE — PRE-ANESTHESIA EVALUATION ADULT - LAST ECHOCARDIOGRAM
02/2020 - mid chest pain and swollen nipples. Cardiac causes ruled out, pain was reaction to Lupron as per patient, tests done in [facility] unknown. Patient doesn't see cardiologist, medical evaluation pending

## 2020-08-27 NOTE — PRE-ANESTHESIA EVALUATION ADULT - NSANTHPMHFT_GEN_ALL_CORE
chart reviewed. dm a1c ~7, fs 188. kat[place] on cpap. remote smoking hx. states et 1 flight no cp/sob. no hx cad/chf chart + med note/clearance reviewed. dm a1c ~7, fs 188. kat[place]ka on cpap. remote smoking hx. states et 1 flight no cp/sob. no hx cad/chf, ekg sr. baseline cri ?1.8-2?

## 2020-08-27 NOTE — ASU DISCHARGE PLAN (ADULT/PEDIATRIC) - ASU DC SPECIAL INSTRUCTIONSFT
Please follow-up with Dr. Grant for removal of your catheter tomorrow.    We have sent a prescription for antibiotics to your pharmacy.

## 2020-08-27 NOTE — BRIEF OPERATIVE NOTE - NSICDXBRIEFPROCEDURE_GEN_ALL_CORE_FT
PROCEDURES:  Urethrogram, retrograde 27-Aug-2020 11:11:31  Kalin Higgins 27-Aug-2020 11:10:34  Kalin Higgins

## 2020-08-27 NOTE — BRIEF OPERATIVE NOTE - OPERATION/FINDINGS
diffuse urethral narrowing in prostatic urethra.  grooves made at 3, 9, 12 o'clock and expanded until contiguous.

## 2020-08-27 NOTE — ASU DISCHARGE PLAN (ADULT/PEDIATRIC) - CARE PROVIDER_API CALL
Braden Grant  UROLOGY  [address] [place] [address], [place] [address]  Phone: [phone]  Fax: [phone]  Follow Up Time:

## 2020-08-28 ENCOUNTER — APPOINTMENT (OUTPATIENT)
Dept: UROLOGY | Facility: CLINIC | Age: 80
End: 2020-08-28
Payer: MEDICARE

## 2020-08-28 VITALS — TEMPERATURE: 98.6 F

## 2020-08-28 PROCEDURE — 99213 OFFICE O/P EST LOW 20 MIN: CPT

## 2020-08-28 NOTE — HISTORY OF PRESENT ILLNESS
[FreeTextEntry1] : Patient is a 81 yo M who presents for prostate stricture, obstructinve LUTS of difficulty voiding, weak stream, straining to empty.  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  He was also on CIC for roughly 1 yr.  He developed LALI thereafter and underwent AUS in 2015 with Dr Munoz.  Had done well until most recently.  Does still leak with certain valsalva/activity - sitting down.\par \par He is now s/p laser urethrotomy of prostate urethral stricture and injection of amniofix.  Doing well since procedure.  Higgins removed today.

## 2020-08-28 NOTE — PHYSICAL EXAM
[General Appearance - Well Developed] : well developed [General Appearance - Well Nourished] : well nourished [Normal Appearance] : normal appearance [Well Groomed] : well groomed [General Appearance - In No Acute Distress] : no acute distress [Scrotum] : the scrotum was normal [FreeTextEntry1] : flanagan removed w ease, AUS pump reactivated and cycled

## 2020-08-28 NOTE — ASSESSMENT
[FreeTextEntry1] : Patient is a 79 yo M who presents for obstructive urinary symptoms.  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  He was also on CIC for roughly 1 yr and is now s/p AUS insertion in 2015.\par S/p laser urethrotomy of prostate urethral stricture and injection of amniofix. \par D/w pt that he may require more extensive TURP, but risk of damage to AUS and likely would need to open perineum to release cuff to pass resectoscope.  Also d/w pt risk of fistula\par F/u 2-3 mos

## 2020-08-29 ENCOUNTER — EMERGENCY (EMERGENCY)
Facility: HOSPITAL | Age: 80
LOS: 1 days | Discharge: HOME | End: 2020-08-29
Attending: EMERGENCY MEDICINE
Payer: MEDICARE

## 2020-08-29 VITALS
HEART RATE: 86 BPM | HEIGHT: 70 IN | TEMPERATURE: 99 F | OXYGEN SATURATION: 97 % | SYSTOLIC BLOOD PRESSURE: 124 MMHG | DIASTOLIC BLOOD PRESSURE: 79 MMHG | RESPIRATION RATE: 18 BRPM | WEIGHT: 255.07 LBS

## 2020-08-29 DIAGNOSIS — Z87.448 PERSONAL HISTORY OF OTHER DISEASES OF URINARY SYSTEM: Chronic | ICD-10-CM

## 2020-08-29 DIAGNOSIS — G56.00 CARPAL TUNNEL SYNDROME, UNSPECIFIED UPPER LIMB: Chronic | ICD-10-CM

## 2020-08-29 LAB
ANION GAP SERPL CALC-SCNC: 13 MMOL/L — SIGNIFICANT CHANGE UP (ref 5–17)
APPEARANCE UR: CLEAR — SIGNIFICANT CHANGE UP
BACTERIA # UR AUTO: NEGATIVE — SIGNIFICANT CHANGE UP
BILIRUB UR-MCNC: NEGATIVE — SIGNIFICANT CHANGE UP
BUN SERPL-MCNC: 40 MG/DL — HIGH (ref 7–23)
CALCIUM SERPL-MCNC: 9.6 MG/DL — SIGNIFICANT CHANGE UP (ref 8.4–10.5)
CHLORIDE SERPL-SCNC: 101 MMOL/L — SIGNIFICANT CHANGE UP (ref 96–108)
CO2 SERPL-SCNC: 25 MMOL/L — SIGNIFICANT CHANGE UP (ref 22–31)
COLOR SPEC: SIGNIFICANT CHANGE UP
CREAT SERPL-MCNC: 1.86 MG/DL — HIGH (ref 0.5–1.3)
DIFF PNL FLD: ABNORMAL
EPI CELLS # UR: 1 /HPF — SIGNIFICANT CHANGE UP
GLUCOSE SERPL-MCNC: 137 MG/DL — HIGH (ref 70–99)
GLUCOSE UR QL: NEGATIVE — SIGNIFICANT CHANGE UP
HYALINE CASTS # UR AUTO: 9 /LPF — HIGH (ref 0–2)
KETONES UR-MCNC: NEGATIVE — SIGNIFICANT CHANGE UP
LEUKOCYTE ESTERASE UR-ACNC: ABNORMAL
NITRITE UR-MCNC: NEGATIVE — SIGNIFICANT CHANGE UP
PH UR: 6 — SIGNIFICANT CHANGE UP (ref 5–8)
POTASSIUM SERPL-MCNC: 4.8 MMOL/L — SIGNIFICANT CHANGE UP (ref 3.5–5.3)
POTASSIUM SERPL-SCNC: 4.8 MMOL/L — SIGNIFICANT CHANGE UP (ref 3.5–5.3)
PROT UR-MCNC: 100 — SIGNIFICANT CHANGE UP
RBC CASTS # UR COMP ASSIST: 12 /HPF — HIGH (ref 0–4)
SODIUM SERPL-SCNC: 139 MMOL/L — SIGNIFICANT CHANGE UP (ref 135–145)
SP GR SPEC: 1.01 — SIGNIFICANT CHANGE UP (ref 1.01–1.02)
UROBILINOGEN FLD QL: NEGATIVE — SIGNIFICANT CHANGE UP
WBC UR QL: 19 /HPF — HIGH (ref 0–5)

## 2020-08-29 PROCEDURE — 81001 URINALYSIS AUTO W/SCOPE: CPT

## 2020-08-29 PROCEDURE — 87086 URINE CULTURE/COLONY COUNT: CPT

## 2020-08-29 PROCEDURE — 51702 INSERT TEMP BLADDER CATH: CPT | Mod: XU

## 2020-08-29 PROCEDURE — 99284 EMERGENCY DEPT VISIT MOD MDM: CPT | Mod: GC

## 2020-08-29 PROCEDURE — 99284 EMERGENCY DEPT VISIT MOD MDM: CPT | Mod: 25

## 2020-08-29 PROCEDURE — 76770 US EXAM ABDO BACK WALL COMP: CPT

## 2020-08-29 PROCEDURE — 80048 BASIC METABOLIC PNL TOTAL CA: CPT

## 2020-08-29 PROCEDURE — 76770 US EXAM ABDO BACK WALL COMP: CPT | Mod: 26

## 2020-08-29 RX ORDER — CEFUROXIME AXETIL 250 MG
250 TABLET ORAL ONCE
Refills: 0 | Status: COMPLETED | OUTPATIENT
Start: 2020-08-29 | End: 2020-08-29

## 2020-08-29 RX ORDER — CEFUROXIME AXETIL 250 MG
1 TABLET ORAL
Qty: 14 | Refills: 0
Start: 2020-08-29 | End: 2020-09-04

## 2020-08-29 RX ADMIN — Medication 250 MILLIGRAM(S): at 23:15

## 2020-08-29 NOTE — ED PROVIDER NOTE - PMH
Anemia    Calculus of kidney  - Left, 2017  Diabetes Mellitus Type II  on insulin  Glaucoma    H/O urethral stricture  after RT of prostate Ca with seeds, underwent multiple dilations procedures  History of Prostate Cancer  with mets to bones, dx 1998 s/p Brachytherapy and Lupron (stopped in 2018)  HTN (Hypertension)    Hyperlipidemia    Obese    Pneumonia  01/2013 treated with PO antibiotics  Seasonal allergies    Sleep apnea  on CPAP  Smoker  1.5 PPD x 25 years quit 1977

## 2020-08-29 NOTE — ED PROVIDER NOTE - PATIENT PORTAL LINK FT
You can access the FollowMyHealth Patient Portal offered by [facility] by registering at the following website: [email or web address]. By joining [facility]’s FollowMyHealth portal, you will also be able to view your health information using other applications (apps) compatible with our system.

## 2020-08-29 NOTE — ED PROVIDER NOTE - PSH
Carpal tunnel syndrome  right wrist 2013  H/O malignant melanoma of skin right ear, excision of neoplasm 11/2011    H/O nephrostomy  and Laser lithotripsy. 2017  History of Cystoscopy multiple  urethral dilation, intravesical anesthetic  History of radiation therapy  for prostate cancer 1998, brachytherapy  Lipoma resection from stomach in 1990    Macular Pucker Left  surgery 6/2010

## 2020-08-29 NOTE — ED PROVIDER NOTE - ATTENDING CONTRIBUTION TO CARE
Attending MD López:  I personally have seen and examined this patient.  Resident note reviewed and agree on plan of care and except where noted.  See HPI, PE, and MDM for details.

## 2020-08-29 NOTE — ED ADULT TRIAGE NOTE - CHIEF COMPLAINT QUOTE
"I had surgery on the 27th - urethral surgery. Every since then I am having difficulty urinating since they remove the catheter. I am feeling cramps every 15 minutes and it is very painful"

## 2020-08-29 NOTE — ED PROVIDER NOTE - PHYSICAL EXAMINATION
GENERAL: elderly male, lying in bed, NAD. Vital signs are within normal limits  HEENT: NC/AT, conjunctiva noninjected, sclera anicteric, moist mucous membranes,  NECK: Supple, trachea midline  LUNG: Nonlabored respirations, no wheezes  CV: RRR, Pulses- Radial: 2+ b/l  ABDOMEN: Nondistended, nontender  MSK: No visible deformities, moving all 4 extremities spontaneously  NEURO: AAOx4 (to person, place, time, event), no tremor, steady gait  PSYCH: Normal mood and affect

## 2020-08-29 NOTE — ED PROVIDER NOTE - NSFOLLOWUPINSTRUCTIONS_ED_ALL_ED_FT
YOU WERE SEEN IN THE ED FOR: urinary retention    A MUKHERJEE WAS PLACED. PLEASE FOLLOW UP WITH YOUR UROLOGIST ON MONDAY TO HAVE THE MUKHERJEE EVALUATED AND FOR A TRIAL OF VOID.    YOU WERE PRESCRIBED: CEFUROXIME  FOLLOW THE INSTRUCTIONS ON THE LABEL/CONTAINER    PLEASE FOLLOW UP WITH YOUR PRIVATE PHYSICIAN WITHIN THE NEXT 72 HOURS. BRING COPIES OF YOUR RESULTS.    RETURN TO THE EMERGENCY DEPARTMENT IF YOU EXPERIENCE ANY NEW/CONCERNING/WORSENING SYMPTOMS.

## 2020-08-29 NOTE — ED PROVIDER NOTE - CARE PROVIDER_API CALL
Braden Grant  UROLOGY  [address] [place] [address], [address]  [place] [address]  Phone: [phone]  Fax: [phone]  Established Patient  Follow Up Time: 1-3 Days

## 2020-08-29 NOTE — ED PROVIDER NOTE - PROGRESS NOTE DETAILS
Raúl Moreno D.O., PGY2 (Resident)  Uro paged again for consult as patient is post-op and had retention x2 days s/p flanagan removal, only able to urinate here. Raúl Moreno D.O., PGY2 (Resident)  Spoke to patient after speaking to urology. Options are either a flanagan or deactivating prosthatic valve. Patient would like the flanagan. Uro will come down and place.

## 2020-08-29 NOTE — ED PROCEDURE NOTE - ATTENDING CONTRIBUTION TO CARE
Attending MD López: Risks, benefit and alternatives of procedure explained to patient and patient demonstrated verbal understanding and consent.  I was present during the key portions of the procedure.

## 2020-08-29 NOTE — CONSULT NOTE ADULT - SUBJECTIVE AND OBJECTIVE BOX
HPI    80 y.o. male  with h/o DM2 on insulin, HTN, obesity, metastatic prostate cancer in  treated with seeds, urethral stricture due to RT s/p artificial urinary sphincter in  and multiple dilations last on 2020 with Dr. Grant c/o difficulty voiding for the last 2 days. States that was urinating small amounts. While in ER voided about 200cc, . Denies fever/chills, dysuria, hematuria, nocturia, incontinence, abd pain, back pain. Patient took last dose of post op Keflex today.      PAST MEDICAL & SURGICAL HISTORY:  H/O urethral stricture: after RT of prostate Ca with seeds, underwent multiple dilations procedures  Calculus of kidney: - Left, 2017  Smoker: 1.5 PPD x 25 years quit   Sleep apnea: on CPAP  Anemia  Pneumonia: 2013 treated with PO antibiotics  Obese  Seasonal allergies  History of Prostate Cancer: with mets to bones, dx  s/p Brachytherapy and Lupron (stopped in )  Glaucoma  Hyperlipidemia  HTN (Hypertension)  Diabetes Mellitus Type II: on insulin  H/O nephrostomy: and Laser lithotripsy.   Carpal tunnel syndrome: right wrist   H/O malignant melanoma of skin right ear, excision of neoplasm 2011  History of radiation therapy: for prostate cancer , brachytherapy  Macular Pucker Left: surgery 2010  History of Cystoscopy multiple: urethral dilation, intravesical anesthetic  Lipoma resection from stomach in       MEDICATIONS  (STANDING):  Home Medications:  allopurinol 300 mg oral tablet: alternates 0.5 tab 1 dasy, 1 tab next day (27 Aug 2020 08:35)  carvedilol 12.5 mg oral tablet: 1 tab(s) orally 2 times a day (27 Aug 2020 08:35)  glimepiride 4 mg oral tablet:  orally 2 times a day (27 Aug 2020 08:35)  Levemir 100 units/mL subcutaneous solution: 24 unit(s) subcutaneous once a day (at bedtime) (27 Aug 2020 08:35)    FAMILY HISTORY:  Family history of stroke  Family history of cardiac disorder      Allergies    No Known Allergies    Intolerances    SOCIAL HISTORY: denies smoking, alcohol    REVIEW OF SYSTEMS: Otherwise negative as stated in HPI    Physical Exam  Gen awake, alert and oriented x 4, NAD  Abd soft, non tender   bladder non palpable. Bedside urinal with about 200cc of light yellow urine.    Vital signs  T(C): 37 (08-29-20 @ 22:40), Max: 37.1 (20 @ 18:26)  HR: 82 (20 @ 22:40)  BP: 122/86 (20 @ 22:40)  SpO2: 98% (20 @ 22:40)  Wt(kg): --      LABS:       @ 21:43    WBC --    / Hct --    / SCr 1.86         139  |  101  |  40<H>  ----------------------------<  137<H>  4.8   |  25  |  1.86<H>    Ca    9.6      29 Aug 2020 21:43        Urinalysis Basic - ( 29 Aug 2020 22:31 )    Color: Light Yellow / Appearance: Clear / S.013 / pH: x  Gluc: x / Ketone: Negative  / Bili: Negative / Urobili: Negative   Blood: x / Protein: 100 / Nitrite: Negative   Leuk Esterase: Moderate / RBC: 12 /hpf / WBC 19 /HPF   Sq Epi: x / Non Sq Epi: 1 /hpf / Bacteria: Negative

## 2020-08-29 NOTE — ED PROVIDER NOTE - OBJECTIVE STATEMENT
80 y.o. 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 Grant) presents to the ED for urinary retention x2 days. While in ED, patient was able to urinate. Denies fever, chills. Denies abdominal pain, back pain.

## 2020-08-29 NOTE — ED PROVIDER NOTE - NS ED ROS FT
CONSTITUTIONAL: No fevers, chills, fatigue, dizziness, weakness  HEENT: No nasal congestion, runny nose, sore throat  CV: No chest pain, palpitations  PULM: No cough, shortness of breath  GI: No abdominal pain, nausea, vomiting, diarrhea,   : URINARY RETENTION. No dysuria, polyuria, hematuria  SKIN: No rashes, swelling  MSK: No muscle aches, joint aches  NEURO: no headache, paresthesia

## 2020-08-29 NOTE — ED PROVIDER NOTE - CLINICAL SUMMARY MEDICAL DECISION MAKING FREE TEXT BOX
Attending MD López: Pt with ho prostate CA, urethral stricture presenting with bladder spasms and difficulty urinating x 1 day, PVR in ED by bedside , no need for urgent catheterization. Plan for UA to exclude UTI, screening labs to check renal fx, urology consultation

## 2020-08-29 NOTE — ED ADULT NURSE NOTE - OBJECTIVE STATEMENT
patient is a 80 year old male with a PMH of HLD, HTN, DM, prostate cancer who presents to the ED from home complaining of urinary retention and discomfort s/p surgery. patient state about every 15  minutes he is getting lower abd cramp. patient states it is difficult to urinate and has had a difficult time. patient believes he is retaining urine. patient is aaox3, lungs CTA bilaterally, abd soft, nondistended, nontender, cap refill <3, radial pulses +2 bilaterally. patient denies chest pain, shortness of breath, ha, dizziness, weakness, numbness or tingling, fever, chills, back pain, changes in bowel or bladder, hematuria, bloody stool. patient comfort or safety provided. will continue to monitor.

## 2020-08-30 VITALS
RESPIRATION RATE: 18 BRPM | SYSTOLIC BLOOD PRESSURE: 120 MMHG | DIASTOLIC BLOOD PRESSURE: 70 MMHG | OXYGEN SATURATION: 98 % | HEART RATE: 80 BPM

## 2020-08-30 LAB
CULTURE RESULTS: NO GROWTH — SIGNIFICANT CHANGE UP
SPECIMEN SOURCE: SIGNIFICANT CHANGE UP

## 2020-08-30 NOTE — PROCEDURE NOTE - NSURITECHNIQUE_GU_A_CORE
A sterile drape was used to cover all adjacent areas/The site was cleaned with soap/water and sterile solution (betadine)/The collection bag is below the level of the patient and urinary bladder/Sterile gloves were worn for the duration of the procedure/The catheter was secured with a securement device (e.g. StatLock)/All applicable medical record documentation is completed/Proper hand hygiene was performed/The catheter was appropriately lubricated

## 2020-08-30 NOTE — PROCEDURE NOTE - ADDITIONAL PROCEDURE DETAILS
Called for pt with AMS with urinary retention. AMS was deactivated according to the instructions on patients device card. 12f placed easily and aseptically without event. 100cc of light yellow urine drained.   To be dcd with flanagan.

## 2020-09-01 ENCOUNTER — APPOINTMENT (OUTPATIENT)
Dept: UROLOGY | Facility: CLINIC | Age: 80
End: 2020-09-01
Payer: MEDICARE

## 2020-09-01 PROCEDURE — 99214 OFFICE O/P EST MOD 30 MIN: CPT

## 2020-09-01 NOTE — ASSESSMENT
[FreeTextEntry1] : Patient is a 79 yo M who presents for obstructive urinary symptoms.  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  He was also on CIC for roughly 1 yr and is now s/p AUS insertion in 2015.\par S/p laser urethrotomy \par Flanagan removed today\par AUS reactivated\par Pt voided twice, PVR 88cc\par He did not have significant urgency as he had from over the weekend.  Felt good stream and emptying well                               \par We had a long conversation about the immediate and long-term plan for his stricture.\par In the immediate situation, I discussed with pt that leaving flaangan catheter would not be good solution given his AUS and high risk of erosion.  I discussed with pt opening cuff and immediately self cathing.  He is comfortable with self cathing and understands cycling pump to open immediately before passing catheter.  He will do this only if as a last resort\par \par We discussed long term that if prostate stricture recurs, his options are indwelling catheter, TURP, salvage prostatectomy, or CIC.  He is willing to consider CIC qweekly if feasible.  I explained that there is a risk of urethral erosion with CIC, but each procedure has significant risk.\par \par He will f/u 1 wk.

## 2020-09-01 NOTE — PHYSICAL EXAM
[General Appearance - Well Developed] : well developed [General Appearance - Well Nourished] : well nourished [Normal Appearance] : normal appearance [Well Groomed] : well groomed [General Appearance - In No Acute Distress] : no acute distress [Scrotum] : the scrotum was normal [Oriented To Time, Place, And Person] : oriented to person, place, and time [Affect] : the affect was normal [Mood] : the mood was normal [Not Anxious] : not anxious [FreeTextEntry1] : AUS pump deactivated, flanagan with clear yellow urine

## 2020-09-01 NOTE — HISTORY OF PRESENT ILLNESS
[FreeTextEntry1] : Patient is a 81 yo M who presents for prostate stricture, obstructinve LUTS of difficulty voiding, weak stream, straining to empty.  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  He was also on CIC for roughly 1 yr.  He developed LALI thereafter and underwent AUS in 2015 with Dr Munoz.  Had done well until most recently.  Does still leak with certain valsalva/activity - sitting down.\par \par He is now s/p laser urethrotomy of prostate urethral stricture and injection of amniofix.  He had severe bladder urgency and frequency after flanagan removed Friday.  He returned to ER following day.  PVR was ~180-200cc and pt voided ~100cc, AUS deactivated and 12 Fr flanagan placed.\par Here for f/u. Urine cx from ER neg.  No fever/chills.

## 2020-09-02 ENCOUNTER — APPOINTMENT (OUTPATIENT)
Age: 80
End: 2020-09-02

## 2020-09-06 LAB
ALBUMIN SERPL ELPH-MCNC: 4 G/DL
ALP BLD-CCNC: 79 U/L
ALT SERPL-CCNC: 13 U/L
ANION GAP SERPL CALC-SCNC: 13 MMOL/L
APPEARANCE: CLEAR
AST SERPL-CCNC: 11 U/L
BASOPHILS # BLD AUTO: 0.02 K/UL
BASOPHILS NFR BLD AUTO: 0.4 %
BILIRUB SERPL-MCNC: 0.2 MG/DL
BILIRUBIN URINE: NEGATIVE
BLOOD URINE: NORMAL
BUN SERPL-MCNC: 56 MG/DL
CALCIUM SERPL-MCNC: 9.6 MG/DL
CHLORIDE SERPL-SCNC: 106 MMOL/L
CO2 SERPL-SCNC: 23 MMOL/L
COLOR: NORMAL
CREAT SERPL-MCNC: 1.92 MG/DL
DHEA SERPL-MCNC: 66 NG/DL
DHEA-S SERPL-MCNC: 110 UG/DL
EOSINOPHIL # BLD AUTO: 0.11 K/UL
EOSINOPHIL NFR BLD AUTO: 2.3 %
ESTIMATED AVERAGE GLUCOSE: 160 MG/DL
ESTRADIOL SERPL-MCNC: 9 PG/ML
FSH SERPL-MCNC: 19.4 IU/L
GLUCOSE QUALITATIVE U: NEGATIVE
GLUCOSE SERPL-MCNC: 118 MG/DL
HBA1C MFR BLD HPLC: 7.2 %
HCT VFR BLD CALC: 32.4 %
HGB BLD-MCNC: 10 G/DL
IMM GRANULOCYTES NFR BLD AUTO: 0.2 %
KETONES URINE: NEGATIVE
LEUKOCYTE ESTERASE URINE: NEGATIVE
LH SERPL-ACNC: 26.3 IU/L
LYMPHOCYTES # BLD AUTO: 1.39 K/UL
LYMPHOCYTES NFR BLD AUTO: 29 %
MAN DIFF?: NORMAL
MCHC RBC-ENTMCNC: 30.6 PG
MCHC RBC-ENTMCNC: 30.9 GM/DL
MCV RBC AUTO: 99.1 FL
MONOCYTES # BLD AUTO: 0.27 K/UL
MONOCYTES NFR BLD AUTO: 5.6 %
NEUTROPHILS # BLD AUTO: 3 K/UL
NEUTROPHILS NFR BLD AUTO: 62.5 %
NITRITE URINE: NEGATIVE
PH URINE: 6
PLATELET # BLD AUTO: 177 K/UL
POTASSIUM SERPL-SCNC: 5.9 MMOL/L
PROLACTIN SERPL-MCNC: 14.7 NG/ML
PROT SERPL-MCNC: 7.2 G/DL
PROTEIN URINE: ABNORMAL
PSA SERPL-MCNC: <0.01 NG/ML
RBC # BLD: 3.27 M/UL
RBC # FLD: 15.3 %
SODIUM SERPL-SCNC: 141 MMOL/L
SPECIFIC GRAVITY URINE: 1.02
TESTOST BND SERPL-MCNC: 4.6 PG/ML
TESTOST SERPL-MCNC: 68.8 NG/DL
URINE CYTOLOGY: NORMAL
UROBILINOGEN URINE: NORMAL
WBC # FLD AUTO: 4.8 K/UL

## 2020-09-08 ENCOUNTER — APPOINTMENT (OUTPATIENT)
Dept: UROLOGY | Facility: CLINIC | Age: 80
End: 2020-09-08
Payer: MEDICARE

## 2020-09-08 VITALS — TEMPERATURE: 97.2 F

## 2020-09-08 PROCEDURE — 99214 OFFICE O/P EST MOD 30 MIN: CPT

## 2020-09-10 NOTE — PHYSICAL EXAM
[General Appearance - Well Developed] : well developed [Normal Appearance] : normal appearance [General Appearance - Well Nourished] : well nourished [General Appearance - In No Acute Distress] : no acute distress [Well Groomed] : well groomed [Scrotum] : the scrotum was normal [FreeTextEntry1] : AUS pump deactivated - reactivated and cycled properly

## 2020-09-10 NOTE — HISTORY OF PRESENT ILLNESS
[FreeTextEntry1] : Patient is a 79 yo M who presents for prostate stricture, obstructinve LUTS of difficulty voiding, weak stream, straining to empty.  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  He was also on CIC for roughly 1 yr.  He developed LALI thereafter and underwent AUS in 2015 with Dr Munoz.  Had done well until most recently.  Does still leak with certain valsalva/activity - sitting down.\par \par He is now s/p laser urethrotomy of prostate urethral stricture and injection of amniofix.  He had severe bladder urgency and frequency after flanagan removed pstop.  He returned to ER following day.  PVR was ~180-200cc and pt voided ~100cc, AUS deactivated and 12 Fr flanagan placed.\par Flanagan was removed but since he notes intermittent bladder spasms and severe bladder pressure to void.\par He was taught to deactivate device and self cathed twice over past 1 wk.  He notes relief of spasms/pressure and significant drainage of urine with self cathing.  No difficulty with self cathing.  AUS remains deactivated.  Using multiple ppd.\par \par No fever/chills.

## 2020-09-10 NOTE — ASSESSMENT
[FreeTextEntry1] : Patient is a 81 yo M who presents for obstructive urinary symptoms.  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  He was also on CIC for roughly 1 yr and is now s/p AUS insertion in 2015.\par S/p laser urethrotomy \par He is having retention postop, he has self catheterized and deactivated AUS.\par AUS was reactivated today, he did void small amounts.  No bladder spasms but not feeling completely empty.\par We discussed options given his condition and pt will plan to observe at this time.  If persists with difficulty voiding he will deactivate device and CIC prn.\par Self catheter supplies and rx provided - due to altered anatomy and pt has had difficulty passing a straight catheter and pt therefore needs a coude tip self catheters\par F/u 2 wks

## 2020-09-15 NOTE — HISTORY OF PRESENT ILLNESS
[FreeTextEntry1] : The patient has prostate cancer initially treated with brachytherapy and external beam radiation about 21 years ago. The patient experienced irritative voiding symptoms and stress urinary incontinence. He developed prostate cancer bone metastasis for which he was placed on androgen deprivation with Lupron and Bicalutamide. The patient has an artificial urinary sphincter placed by Dr Hussein Munoz in 2015. CT of the abdomen and pelvis 05/26/16 found a left lower pole renal mass suspicious for neoplasm. There was a right lower pole renal calculus. Bone density scan 11/28/16 found low bone mass consistent with osteopenia. Further examination found renal mass not to be neoplastic.\par \par Patient complained of depression, fatigue and malaise all of which substantially improved after stopping Lupron. November 8, 2018 was date of last Lupron injection. It was 45 mg which was anticipated to be effective for 6 months. Bicalutamide was discontinued about two years ago. August 14, 2019 PSA< 0.01; testosterone 10.9 ng/dl ([phone])\par \par Patient and wife spending winter in [place], [place]. Patient states urination is good with artificial urinary sphincter working wellaation showed no serious pathology. Cardiac evaluation including stress test and echocardiogram were negative for cardiac pathology. Patient experienced areola pain and tenderness bilaterally centered behind nipples in small volume breast tissue. No hard nodules. No significant gynecomastia.\par \par Today 8/12/20 since stopping Lupron feels 100 % improved with regard to androgen deprivation symptoms. Has more vitality less somnolence..\par \par Patient reports struggling to urinate. Strains to urinate. Feels he does not empty completely. stream is week.\par

## 2020-09-15 NOTE — ADDENDUM
[FreeTextEntry1] : I, Cornelia Jasonin, acted solely as a scribe for Dr. Ki Taylor on this date 09/15/2020.\par \par All medical record entries made by the Scribe were at my, Dr. Ki Taylor, direction and personally dictated by me on 09/15/2020. I have reviewed the chart and agree that the record accurately reflects my personal performance of the history, physical exam, assessment and plan.  I have also personally directed, reviewed and agreed with the chart.

## 2020-09-15 NOTE — PHYSICAL EXAM
[General Appearance - Well Developed] : well developed [Normal Appearance] : normal appearance [General Appearance - In No Acute Distress] : no acute distress [Skin Color & Pigmentation] : normal skin color and pigmentation [Abdomen Tenderness] : non-tender [Abdomen Soft] : soft [Edema] : no peripheral edema [] : no respiratory distress [Exaggerated Use Of Accessory Muscles For Inspiration] : no accessory muscle use [Respiration, Rhythm And Depth] : normal respiratory rhythm and effort [Affect] : the affect was normal [Oriented To Time, Place, And Person] : oriented to person, place, and time [Mood] : the mood was normal [Not Anxious] : not anxious [No Focal Deficits] : no focal deficits [Normal Station and Gait] : the gait and station were normal for the patient's age

## 2020-09-15 NOTE — ASSESSMENT
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 As the patient has a artificial urinary sphincter, I feel it is safer to have the cystoscopy that he needs performed by Dr. Braden Grant. He has had multiple dilations and direct vision internal urethrotomy in the past. He developed stress urinary incontinence and underwent placement and trifacial urinary sphincter with Dr. Munoz. I will refer pt to Dr. Braden Grant who can evaluate for possible stricture and treat appropriately. \par \par \par \par

## 2020-09-16 ENCOUNTER — NON-APPOINTMENT (OUTPATIENT)
Age: 80
End: 2020-09-16

## 2020-09-18 ENCOUNTER — OUTPATIENT (OUTPATIENT)
Dept: OUTPATIENT SERVICES | Facility: HOSPITAL | Age: 80
LOS: 1 days | End: 2020-09-18
Payer: MEDICARE

## 2020-09-18 ENCOUNTER — APPOINTMENT (OUTPATIENT)
Dept: UROLOGY | Facility: CLINIC | Age: 80
End: 2020-09-18
Payer: MEDICARE

## 2020-09-18 VITALS
DIASTOLIC BLOOD PRESSURE: 77 MMHG | SYSTOLIC BLOOD PRESSURE: 145 MMHG | RESPIRATION RATE: 17 BRPM | HEART RATE: 72 BPM | TEMPERATURE: 98 F

## 2020-09-18 VITALS — TEMPERATURE: 98 F

## 2020-09-18 DIAGNOSIS — N50.811 RIGHT TESTICULAR PAIN: ICD-10-CM

## 2020-09-18 DIAGNOSIS — N50.89 OTHER SPECIFIED DISORDERS OF THE MALE GENITAL ORGANS: ICD-10-CM

## 2020-09-18 DIAGNOSIS — G56.00 CARPAL TUNNEL SYNDROME, UNSPECIFIED UPPER LIMB: Chronic | ICD-10-CM

## 2020-09-18 DIAGNOSIS — Z87.448 PERSONAL HISTORY OF OTHER DISEASES OF URINARY SYSTEM: Chronic | ICD-10-CM

## 2020-09-18 PROCEDURE — 52000 CYSTOURETHROSCOPY: CPT

## 2020-09-18 PROCEDURE — 99213 OFFICE O/P EST LOW 20 MIN: CPT | Mod: 25

## 2020-09-18 NOTE — ADDENDUM
[FreeTextEntry1] : Cystoscopy today showed AUS coapting well, no cuff erosion\par D/w pt trial of abx and f/u 2 wks for repeat exam

## 2020-09-18 NOTE — PHYSICAL EXAM
[General Appearance - Well Developed] : well developed [General Appearance - Well Nourished] : well nourished [Normal Appearance] : normal appearance [Well Groomed] : well groomed [General Appearance - In No Acute Distress] : no acute distress [FreeTextEntry1] : there is small amount of edema/fluid surrounding pump/tubing.  Pump is able to cycle and deactivated.  He reports TTP with exam of pump.  There is no perineal edema or skin changes.  No scrotal skin or lower abd changes suggestive of superficial infection

## 2020-09-18 NOTE — HISTORY OF PRESENT ILLNESS
[FreeTextEntry1] : Patient is a 79 yo M who presents for prostate stricture, obstructive LUTS of difficulty voiding, weak stream, straining to empty.  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  He was also on CIC for roughly 1 yr.  He developed LALI thereafter and underwent AUS in 2015 with Dr Munoz.  Had done well until most recently.  Does still leak with certain valsalva/activity - sitting down.\par \par He is now s/p laser urethrotomy of prostate urethral stricture and injection of amniofix 8/27/20.  He had severe bladder urgency and frequency after flanagan removed pstop.  He returned to ER following day.  PVR was ~180-200cc and pt voided ~100cc, AUS deactivated and 12 Fr flanagan placed.\par Flanagan was removed but since he notes intermittent bladder spasms and severe bladder pressure to void.\par He was taught to deactivate device and was performing self cathing.  Since last visit, he deactivated his AUS at home and has been self cathing daily.  He has been cathing without any difficulty.  No resistance.  Denies hematuria or blood per urethra.  No fever/chills.  However 5 days ago he notes that his R hemiscrotum was swollen and painful.  He then noted he had difficulty feeling his AUS pump.  He notes that day prior he had felt pump and was deactivated/dimpled.  Using multiple ppd for leakage.

## 2020-09-18 NOTE — ASSESSMENT
[FreeTextEntry1] : Patient is a 81 yo M who presents for obstructive urinary symptoms.  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  He was also on CIC for roughly 1 yr and is now s/p AUS insertion in 2015.\par S/p laser urethrotomy \par Has been self cathing with AUS deactivated.\par ?low grade infection of pump\par Concerned about cuff -- will perform cysto to assess for erosion\par Abx given, he has not started yet\par Urine for cx

## 2020-09-21 ENCOUNTER — APPOINTMENT (OUTPATIENT)
Age: 80
End: 2020-09-21

## 2020-09-29 NOTE — CONSULT NOTE ADULT - ASSESSMENT
80 y.o. male  with h/o DM2 on insulin, HTN, obesity, metastatic prostate cancer in 2011 treated with seeds, urethral stricture due to RT s/p artificial urinary sphincter in 2015 and multiple dilations last on 8/27/2020 with Dr. Grant c/o difficulty voiding for the last 2 days     AVSS, NAD, urinated while in ER. PVR<200cc    TBD w Dr. Grant 80 y.o. male  with h/o DM2 on insulin, HTN, obesity, metastatic prostate cancer in 2011 treated with seeds, urethral stricture due to RT s/p artificial urinary sphincter in 2015 and multiple dilations last on 8/27/2020 with Dr. Grant c/o difficulty voiding for the last 2 days     AVSS, NAD, urinated while in ER. PVR<200cc      Plan  - recommend AUS deactivation with 12F flanagan catheter placement or AUS deactivation with no flanagan allowing for continuous leakage of urine    Plan discussed with attending, Dr. Grant [Restricted in physically strenuous activity but ambulatory and able to carry out work of a light or sedentary nature] : Status 1- Restricted in physically strenuous activity but ambulatory and able to carry out work of a light or sedentary nature, e.g., light house work, office work [Normal] : no JVD, no calf tenderness, venous stasis changes, varices [de-identified] : Tracheostomy site healing well [de-identified] : left axilla cyst, no adenopathy in axilla bilaterally. No masses, discharge in bilateral breast

## 2020-09-30 DIAGNOSIS — N35.919 UNSPECIFIED URETHRAL STRICTURE, MALE, UNSPECIFIED SITE: ICD-10-CM

## 2020-09-30 DIAGNOSIS — N42.89 OTHER SPECIFIED DISORDERS OF PROSTATE: ICD-10-CM

## 2020-09-30 DIAGNOSIS — N50.811 RIGHT TESTICULAR PAIN: ICD-10-CM

## 2020-09-30 DIAGNOSIS — N50.89 OTHER SPECIFIED DISORDERS OF THE MALE GENITAL ORGANS: ICD-10-CM

## 2020-10-06 ENCOUNTER — APPOINTMENT (OUTPATIENT)
Dept: UROLOGY | Facility: CLINIC | Age: 80
End: 2020-10-06
Payer: MEDICARE

## 2020-10-06 VITALS — TEMPERATURE: 97.7 F

## 2020-10-06 PROCEDURE — 99214 OFFICE O/P EST MOD 30 MIN: CPT | Mod: PD

## 2020-10-06 NOTE — HISTORY OF PRESENT ILLNESS
[FreeTextEntry1] : Patient is a 81 yo M who presents for prostate stricture, obstructive LUTS of difficulty voiding, weak stream, straining to empty.  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  He was also on CIC for roughly 1 yr.  He developed LALI thereafter and underwent AUS in 2015 with Dr Munoz.  Had done well until most recently.  Does still leak with certain valsalva/activity - sitting down.\par \par He is now s/p laser urethrotomy of prostate urethral stricture and injection of amniofix 8/27/20.  He had severe bladder urgency and frequency after flanagan removed pstop.  He returned to ER following day.  PVR was ~180-200cc and pt voided ~100cc, AUS deactivated and 12 Fr flanagan placed.\par Flanagan was removed but since he notes intermittent bladder spasms and severe bladder pressure to void.\par He was taught to deactivate device and was performing self cathing.  Currently his AUS is deactivated and has been self cathing daily.  He has been cathing without any difficulty.  No resistance.  Denies hematuria or blood per urethra.  No fever/chills.  \par \par He does note that his R hemiscrotum is more swollen and painful since last visit despite no obvious erosion on cysto and abx.  He has no fever/chills.  No drainage. DISCHARGE

## 2020-10-06 NOTE — PHYSICAL EXAM
[General Appearance - Well Developed] : well developed [General Appearance - Well Nourished] : well nourished [Normal Appearance] : normal appearance [Well Groomed] : well groomed [General Appearance - In No Acute Distress] : no acute distress [FreeTextEntry1] : there is local edema and and scrotal skin thickness surrounding AUS pump.  No obvious fluctuance or crepitus.  Mild erythema.  no pain over perineum or pubically/suprapubic.

## 2020-10-06 NOTE — ASSESSMENT
[FreeTextEntry1] : Patient is a 79 yo M who presents for obstructive urinary symptoms.  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  He was also on CIC for roughly 1 yr and is now s/p AUS insertion in 2015.\par S/p laser urethrotomy 8/27/20.\par Has been self cathing with AUS deactivated.\par There appears to be localized infection of AUS pump = unclear if 2/2 surgery or catheters/catherizations.\par D/w pt that would at this stage plan for AUS device removal and placement of flanagan vs SPT at that time.  D/w pt that prolonged abx unlikely to help given device itself likely has biofilm and bacteria adherence which would require removal of device to treat\par At this time will hold off until maurice-OR for further abx\par D/w pt that for his prostate stricture will also perform RUG to assess for possible fistula.\par D/w pt that options for prostate stricture include self cath, indwelling flanagan vs SPT or urinary diversion - he is not willing to undergo diversion.  At this time he would prefer to resume CIC if possible after AUS removal - d/w pt that would leave flanagan immediately postop and possible drain.\par Urine for repeat cx

## 2020-10-07 ENCOUNTER — TRANSCRIPTION ENCOUNTER (OUTPATIENT)
Age: 80
End: 2020-10-07

## 2020-10-07 ENCOUNTER — INPATIENT (INPATIENT)
Facility: HOSPITAL | Age: 80
LOS: 2 days | Discharge: HOME | DRG: 699 | End: 2020-10-10
Attending: UROLOGY | Admitting: UROLOGY
Payer: MEDICARE

## 2020-10-07 VITALS
SYSTOLIC BLOOD PRESSURE: 124 MMHG | TEMPERATURE: 98 F | HEIGHT: 70 IN | RESPIRATION RATE: 18 BRPM | HEART RATE: 80 BPM | DIASTOLIC BLOOD PRESSURE: 75 MMHG | OXYGEN SATURATION: 95 %

## 2020-10-07 DIAGNOSIS — N39.0 URINARY TRACT INFECTION, SITE NOT SPECIFIED: ICD-10-CM

## 2020-10-07 DIAGNOSIS — E78.5 HYPERLIPIDEMIA, UNSPECIFIED: ICD-10-CM

## 2020-10-07 DIAGNOSIS — G56.00 CARPAL TUNNEL SYNDROME, UNSPECIFIED UPPER LIMB: Chronic | ICD-10-CM

## 2020-10-07 DIAGNOSIS — Z01.818 ENCOUNTER FOR OTHER PREPROCEDURAL EXAMINATION: ICD-10-CM

## 2020-10-07 DIAGNOSIS — B99.9 UNSPECIFIED INFECTIOUS DISEASE: ICD-10-CM

## 2020-10-07 DIAGNOSIS — I10 ESSENTIAL (PRIMARY) HYPERTENSION: ICD-10-CM

## 2020-10-07 DIAGNOSIS — E11.9 TYPE 2 DIABETES MELLITUS WITHOUT COMPLICATIONS: ICD-10-CM

## 2020-10-07 DIAGNOSIS — R91.8 OTHER NONSPECIFIC ABNORMAL FINDING OF LUNG FIELD: ICD-10-CM

## 2020-10-07 DIAGNOSIS — G47.33 OBSTRUCTIVE SLEEP APNEA (ADULT) (PEDIATRIC): ICD-10-CM

## 2020-10-07 DIAGNOSIS — Z87.448 PERSONAL HISTORY OF OTHER DISEASES OF URINARY SYSTEM: Chronic | ICD-10-CM

## 2020-10-07 LAB
ALBUMIN SERPL ELPH-MCNC: 3.8 G/DL — SIGNIFICANT CHANGE UP (ref 3.3–5)
ALP SERPL-CCNC: 89 U/L — SIGNIFICANT CHANGE UP (ref 40–120)
ALT FLD-CCNC: 11 U/L — SIGNIFICANT CHANGE UP (ref 10–45)
ANION GAP SERPL CALC-SCNC: 13 MMOL/L — SIGNIFICANT CHANGE UP (ref 5–17)
APPEARANCE UR: ABNORMAL
APTT BLD: 30.7 SEC — SIGNIFICANT CHANGE UP (ref 27.5–35.5)
AST SERPL-CCNC: 11 U/L — SIGNIFICANT CHANGE UP (ref 10–40)
BASOPHILS # BLD AUTO: 0.02 K/UL — SIGNIFICANT CHANGE UP (ref 0–0.2)
BASOPHILS NFR BLD AUTO: 0.3 % — SIGNIFICANT CHANGE UP (ref 0–2)
BILIRUB SERPL-MCNC: 0.3 MG/DL — SIGNIFICANT CHANGE UP (ref 0.2–1.2)
BILIRUB UR-MCNC: NEGATIVE — SIGNIFICANT CHANGE UP
BLD GP AB SCN SERPL QL: NEGATIVE — SIGNIFICANT CHANGE UP
BUN SERPL-MCNC: 58 MG/DL — HIGH (ref 7–23)
CALCIUM SERPL-MCNC: 9.8 MG/DL — SIGNIFICANT CHANGE UP (ref 8.4–10.5)
CHLORIDE SERPL-SCNC: 109 MMOL/L — HIGH (ref 96–108)
CO2 SERPL-SCNC: 20 MMOL/L — LOW (ref 22–31)
COLOR SPEC: SIGNIFICANT CHANGE UP
CREAT SERPL-MCNC: 1.77 MG/DL — HIGH (ref 0.5–1.3)
DIFF PNL FLD: ABNORMAL
EOSINOPHIL # BLD AUTO: 0.15 K/UL — SIGNIFICANT CHANGE UP (ref 0–0.5)
EOSINOPHIL NFR BLD AUTO: 2.2 % — SIGNIFICANT CHANGE UP (ref 0–6)
GAS PNL BLDV: SIGNIFICANT CHANGE UP
GLUCOSE BLDC GLUCOMTR-MCNC: 126 MG/DL — HIGH (ref 70–99)
GLUCOSE SERPL-MCNC: 103 MG/DL — HIGH (ref 70–99)
GLUCOSE UR QL: NEGATIVE — SIGNIFICANT CHANGE UP
HCT VFR BLD CALC: 30.9 % — LOW (ref 39–50)
HGB BLD-MCNC: 9.7 G/DL — LOW (ref 13–17)
IMM GRANULOCYTES NFR BLD AUTO: 0.4 % — SIGNIFICANT CHANGE UP (ref 0–1.5)
INR BLD: 1.04 RATIO — SIGNIFICANT CHANGE UP (ref 0.88–1.16)
KETONES UR-MCNC: NEGATIVE — SIGNIFICANT CHANGE UP
LEUKOCYTE ESTERASE UR-ACNC: ABNORMAL
LYMPHOCYTES # BLD AUTO: 1.63 K/UL — SIGNIFICANT CHANGE UP (ref 1–3.3)
LYMPHOCYTES # BLD AUTO: 24.1 % — SIGNIFICANT CHANGE UP (ref 13–44)
MCHC RBC-ENTMCNC: 29.8 PG — SIGNIFICANT CHANGE UP (ref 27–34)
MCHC RBC-ENTMCNC: 31.4 GM/DL — LOW (ref 32–36)
MCV RBC AUTO: 95.1 FL — SIGNIFICANT CHANGE UP (ref 80–100)
MONOCYTES # BLD AUTO: 0.39 K/UL — SIGNIFICANT CHANGE UP (ref 0–0.9)
MONOCYTES NFR BLD AUTO: 5.8 % — SIGNIFICANT CHANGE UP (ref 2–14)
NEUTROPHILS # BLD AUTO: 4.53 K/UL — SIGNIFICANT CHANGE UP (ref 1.8–7.4)
NEUTROPHILS NFR BLD AUTO: 67.2 % — SIGNIFICANT CHANGE UP (ref 43–77)
NITRITE UR-MCNC: NEGATIVE — SIGNIFICANT CHANGE UP
NRBC # BLD: 0 /100 WBCS — SIGNIFICANT CHANGE UP (ref 0–0)
PH UR: 5.5 — SIGNIFICANT CHANGE UP (ref 5–8)
PLATELET # BLD AUTO: 195 K/UL — SIGNIFICANT CHANGE UP (ref 150–400)
POTASSIUM SERPL-MCNC: 4.8 MMOL/L — SIGNIFICANT CHANGE UP (ref 3.5–5.3)
POTASSIUM SERPL-SCNC: 4.8 MMOL/L — SIGNIFICANT CHANGE UP (ref 3.5–5.3)
PROCALCITONIN SERPL-MCNC: 0.22 NG/ML — HIGH (ref 0.02–0.1)
PROT SERPL-MCNC: 7.8 G/DL — SIGNIFICANT CHANGE UP (ref 6–8.3)
PROT UR-MCNC: ABNORMAL
PROTHROM AB SERPL-ACNC: 12.4 SEC — SIGNIFICANT CHANGE UP (ref 10.6–13.6)
RBC # BLD: 3.25 M/UL — LOW (ref 4.2–5.8)
RBC # FLD: 15.5 % — HIGH (ref 10.3–14.5)
RH IG SCN BLD-IMP: POSITIVE — SIGNIFICANT CHANGE UP
SARS-COV-2 RNA SPEC QL NAA+PROBE: SIGNIFICANT CHANGE UP
SODIUM SERPL-SCNC: 142 MMOL/L — SIGNIFICANT CHANGE UP (ref 135–145)
SP GR SPEC: 1.01 — SIGNIFICANT CHANGE UP (ref 1.01–1.02)
UROBILINOGEN FLD QL: NEGATIVE — SIGNIFICANT CHANGE UP
WBC # BLD: 6.75 K/UL — SIGNIFICANT CHANGE UP (ref 3.8–10.5)
WBC # FLD AUTO: 6.75 K/UL — SIGNIFICANT CHANGE UP (ref 3.8–10.5)

## 2020-10-07 PROCEDURE — 74176 CT ABD & PELVIS W/O CONTRAST: CPT | Mod: 26

## 2020-10-07 PROCEDURE — 99285 EMERGENCY DEPT VISIT HI MDM: CPT | Mod: CS

## 2020-10-07 PROCEDURE — 93010 ELECTROCARDIOGRAM REPORT: CPT

## 2020-10-07 PROCEDURE — 99223 1ST HOSP IP/OBS HIGH 75: CPT

## 2020-10-07 PROCEDURE — 99222 1ST HOSP IP/OBS MODERATE 55: CPT | Mod: 57,AI

## 2020-10-07 RX ORDER — HEPARIN SODIUM 5000 [USP'U]/ML
5000 INJECTION INTRAVENOUS; SUBCUTANEOUS EVERY 8 HOURS
Refills: 0 | Status: DISCONTINUED | OUTPATIENT
Start: 2020-10-07 | End: 2020-10-08

## 2020-10-07 RX ORDER — CEFTRIAXONE 500 MG/1
1000 INJECTION, POWDER, FOR SOLUTION INTRAMUSCULAR; INTRAVENOUS ONCE
Refills: 0 | Status: COMPLETED | OUTPATIENT
Start: 2020-10-07 | End: 2020-10-07

## 2020-10-07 RX ORDER — SENNA PLUS 8.6 MG/1
2 TABLET ORAL AT BEDTIME
Refills: 0 | Status: DISCONTINUED | OUTPATIENT
Start: 2020-10-07 | End: 2020-10-08

## 2020-10-07 RX ORDER — OXYCODONE AND ACETAMINOPHEN 5; 325 MG/1; MG/1
1 TABLET ORAL EVERY 4 HOURS
Refills: 0 | Status: DISCONTINUED | OUTPATIENT
Start: 2020-10-07 | End: 2020-10-08

## 2020-10-07 RX ORDER — ONDANSETRON 8 MG/1
4 TABLET, FILM COATED ORAL EVERY 6 HOURS
Refills: 0 | Status: DISCONTINUED | OUTPATIENT
Start: 2020-10-07 | End: 2020-10-08

## 2020-10-07 RX ORDER — ACETAMINOPHEN 500 MG
650 TABLET ORAL EVERY 6 HOURS
Refills: 0 | Status: DISCONTINUED | OUTPATIENT
Start: 2020-10-07 | End: 2020-10-08

## 2020-10-07 RX ORDER — INFLUENZA VIRUS VACCINE 15; 15; 15; 15 UG/.5ML; UG/.5ML; UG/.5ML; UG/.5ML
0.5 SUSPENSION INTRAMUSCULAR ONCE
Refills: 0 | Status: DISCONTINUED | OUTPATIENT
Start: 2020-10-07 | End: 2020-10-10

## 2020-10-07 RX ORDER — DEXTROSE 50 % IN WATER 50 %
15 SYRINGE (ML) INTRAVENOUS ONCE
Refills: 0 | Status: DISCONTINUED | OUTPATIENT
Start: 2020-10-07 | End: 2020-10-08

## 2020-10-07 RX ORDER — INSULIN LISPRO 100/ML
VIAL (ML) SUBCUTANEOUS
Refills: 0 | Status: DISCONTINUED | OUTPATIENT
Start: 2020-10-07 | End: 2020-10-08

## 2020-10-07 RX ORDER — OXYCODONE AND ACETAMINOPHEN 5; 325 MG/1; MG/1
2 TABLET ORAL EVERY 6 HOURS
Refills: 0 | Status: DISCONTINUED | OUTPATIENT
Start: 2020-10-07 | End: 2020-10-08

## 2020-10-07 RX ORDER — INSULIN GLARGINE 100 [IU]/ML
12 INJECTION, SOLUTION SUBCUTANEOUS AT BEDTIME
Refills: 0 | Status: DISCONTINUED | OUTPATIENT
Start: 2020-10-07 | End: 2020-10-07

## 2020-10-07 RX ORDER — CARVEDILOL PHOSPHATE 80 MG/1
12.5 CAPSULE, EXTENDED RELEASE ORAL EVERY 12 HOURS
Refills: 0 | Status: DISCONTINUED | OUTPATIENT
Start: 2020-10-07 | End: 2020-10-08

## 2020-10-07 RX ORDER — INSULIN GLARGINE 100 [IU]/ML
12 INJECTION, SOLUTION SUBCUTANEOUS AT BEDTIME
Refills: 0 | Status: DISCONTINUED | OUTPATIENT
Start: 2020-10-07 | End: 2020-10-08

## 2020-10-07 RX ORDER — DEXTROSE 50 % IN WATER 50 %
25 SYRINGE (ML) INTRAVENOUS ONCE
Refills: 0 | Status: DISCONTINUED | OUTPATIENT
Start: 2020-10-07 | End: 2020-10-08

## 2020-10-07 RX ORDER — SODIUM CHLORIDE 9 MG/ML
1000 INJECTION INTRAMUSCULAR; INTRAVENOUS; SUBCUTANEOUS
Refills: 0 | Status: DISCONTINUED | OUTPATIENT
Start: 2020-10-07 | End: 2020-10-08

## 2020-10-07 RX ORDER — CEFTRIAXONE 500 MG/1
1000 INJECTION, POWDER, FOR SOLUTION INTRAMUSCULAR; INTRAVENOUS EVERY 24 HOURS
Refills: 0 | Status: DISCONTINUED | OUTPATIENT
Start: 2020-10-08 | End: 2020-10-08

## 2020-10-07 RX ORDER — DEXTROSE 50 % IN WATER 50 %
12.5 SYRINGE (ML) INTRAVENOUS ONCE
Refills: 0 | Status: DISCONTINUED | OUTPATIENT
Start: 2020-10-07 | End: 2020-10-08

## 2020-10-07 RX ORDER — INSULIN LISPRO 100/ML
VIAL (ML) SUBCUTANEOUS AT BEDTIME
Refills: 0 | Status: DISCONTINUED | OUTPATIENT
Start: 2020-10-07 | End: 2020-10-08

## 2020-10-07 RX ORDER — INSULIN DETEMIR 100/ML (3)
12 INSULIN PEN (ML) SUBCUTANEOUS AT BEDTIME
Refills: 0 | Status: DISCONTINUED | OUTPATIENT
Start: 2020-10-07 | End: 2020-10-07

## 2020-10-07 RX ORDER — GLUCAGON INJECTION, SOLUTION 0.5 MG/.1ML
1 INJECTION, SOLUTION SUBCUTANEOUS ONCE
Refills: 0 | Status: DISCONTINUED | OUTPATIENT
Start: 2020-10-07 | End: 2020-10-08

## 2020-10-07 RX ORDER — SODIUM CHLORIDE 9 MG/ML
1000 INJECTION, SOLUTION INTRAVENOUS
Refills: 0 | Status: DISCONTINUED | OUTPATIENT
Start: 2020-10-07 | End: 2020-10-08

## 2020-10-07 RX ORDER — ALLOPURINOL 300 MG
150 TABLET ORAL DAILY
Refills: 0 | Status: DISCONTINUED | OUTPATIENT
Start: 2020-10-07 | End: 2020-10-08

## 2020-10-07 RX ADMIN — CEFTRIAXONE 100 MILLIGRAM(S): 500 INJECTION, POWDER, FOR SOLUTION INTRAMUSCULAR; INTRAVENOUS at 17:34

## 2020-10-07 RX ADMIN — SODIUM CHLORIDE 100 MILLILITER(S): 9 INJECTION INTRAMUSCULAR; INTRAVENOUS; SUBCUTANEOUS at 20:37

## 2020-10-07 RX ADMIN — HEPARIN SODIUM 5000 UNIT(S): 5000 INJECTION INTRAVENOUS; SUBCUTANEOUS at 21:42

## 2020-10-07 RX ADMIN — INSULIN GLARGINE 12 UNIT(S): 100 INJECTION, SOLUTION SUBCUTANEOUS at 21:43

## 2020-10-07 NOTE — H&P ADULT - ATTENDING COMMENTS
Pt seen/examined.  Case discussed with housestaff/PA team.  Agree with above note history, physical and assessment/plan.  OR for AUS removal

## 2020-10-07 NOTE — ED PROVIDER NOTE - PHYSICAL EXAMINATION
GENERAL: AAOx4, GCS 15, NAD, WDWN; HEENT: MMM, no jugular venous distension, supple neck, PERRLA, EOMI, nonicteric sclera; PULM: CTA B, no crackles/rubs/rales; CV: RRR, S1S2, no MRG; ABD: Flat abdomen, NTND, no R/G/R, no CVAT.   -- R>L scrotal swelling with swelling and obvious leaking of urine around glans.  R testicle is tenderness to palpation but not erythematous, not severe/out of proportion.   MSK: ONEAL, +2 pulses x4;  NEURO: No obvious focal deficits; PSYCH: AAOx3, clear thought and normal sensorium.

## 2020-10-07 NOTE — ED PROVIDER NOTE - CLINICAL SUMMARY MEDICAL DECISION MAKING FREE TEXT BOX
Swelling of penis/scrotum and cloudy urine after manipulation of implanted scrotal device and multiple e/o self catheterization.  Likely complex UTI v infected scrotal implant.  Needs empiric abx after pan cx, flanagan catheter placement, CT AP to eval device, uro c/s.  --BMM

## 2020-10-07 NOTE — ED ADULT NURSE REASSESSMENT NOTE - NS ED NURSE REASSESS COMMENT FT1
Patient A&Ox4, breathing spontaneous and unlabored, aware he is being admitted for sx. Catheter in place from urology today. Patient denies pain at this time, vitals stable on 2L-O2. Awaiting bed assignment at this time. Additional blood work drawn as per MD orders. Bed locked and in lowest position with side rails up for safety.

## 2020-10-07 NOTE — H&P ADULT - NSICDXPASTMEDICALHX_GEN_ALL_CORE_FT
PAST MEDICAL HISTORY:  Anemia     Calculus of kidney - Left, 2017    Diabetes Mellitus Type II on insulin    Glaucoma     H/O urethral stricture after RT of prostate Ca with seeds, underwent multiple dilations procedures    History of Prostate Cancer with mets to bones, dx 1998 s/p Brachytherapy and Lupron (stopped in 2018)    HTN (Hypertension)     Hyperlipidemia     Obese     Pneumonia 01/2013 treated with PO antibiotics    Seasonal allergies     Sleep apnea on CPAP    Smoker 1.5 PPD x 25 years quit 1977

## 2020-10-07 NOTE — ED PROVIDER NOTE - NOTES
State spoke to Qdoc in regards to pt, now that pt in main ED will offically see and follow CT as requested

## 2020-10-07 NOTE — CONSULT NOTE ADULT - PROBLEM SELECTOR RECOMMENDATION 6
Noted to have new focal lingular consolidation on imaging. Given patient's hx of metastatic cancer no longer on treatment (due to side effects from lupron) would have concern that this represents new metastatic disease. No clinical signs of pneumonia at this time. Will need outpatient followup.  Would also monitor respiratory status clinically while here.

## 2020-10-07 NOTE — H&P ADULT - PROBLEM SELECTOR PLAN 1
-NPO after midnight for surgical excision of AUS   -IV fluid hydration  -Analgesia prn  -Continue CTX  -Follow up Urine Cx   -AM labs  -Discussed risks, benefits, alternatives, pt voiced understanding

## 2020-10-07 NOTE — CONSULT NOTE ADULT - PROBLEM SELECTOR RECOMMENDATION 9
Patient is an intermediate risk patient for intermediate risk procedure and is medically optimized for AUS extraction at this time. RCRI = 1 ~6% 30 day risk of MI, death, cardiac arrest.  Would carefully monitor respiratory status post operatively given morbid obesity and BESSIE, likely may require CPAP/BiPAP support post op.  Antibiotics and other management per urology team

## 2020-10-07 NOTE — CONSULT NOTE ADULT - SUBJECTIVE AND OBJECTIVE BOX
HPI  81 y/o M w/ PMHx of HTN, glaucoma, HLD, DM2, CKD (~2 baseline creatinine per records), BESSIE, metastatic prostate ca treated with radiation in  c/b urethral strictures requiring multiple dilations last on 2020, incontinence s/p AUS in  presents to ED c/o scrotal pain and swelling around AUS pump worsening since yesterday. Had been evaluated by his urologist Dr Grant and felt to have AUS pump infection one day ago. Has been self catheterizing with shutoff of AUS. Patient primarily c/o scrotum pain and penile swelling as well as urine leaking. No fevers/chills/chest pain/cough/sob/leg swelling. In the past has had chest pain, reports an angiogram approximately 30 years ago. Did last have episode of pressure like substernal chest pain in 2020 for which he underwent a stress test which was negative per patient in [place] (also mentioned in outpatient notes in [facility] though actual report not available). Reports that in the past this had been felt related possibly to his hormone therapy? though in February this had already been discontinued. Patient has also had issues with statin myopathy for which he reports he is no longer using Crestor but has started another more gentle medication he cannot recall the name of. Had also undergone echocardiogram without concerning findings. Patient continues to use his own CPAP at night without issue and is now on 26 units of levemir qhs instead of  splitting his dose 22/4 as previously. Reports being able to walk up one flight of stairs without issue. Reports being able to walk multiple blocks without issue. Has not had any exertional chest pain. Does report that in the past post op he has required additional respiratory support to maintain sats.       PAST MEDICAL & SURGICAL HISTORY:  H/O urethral stricture  after RT of prostate Ca with seeds, underwent multiple dilations procedures    Calculus of kidney  - Left, 2017    Smoker  1.5 PPD x 25 years quit     Sleep apnea  on CPAP    Anemia    Pneumonia  2013 treated with PO antibiotics    Obese    Seasonal allergies    History of Prostate Cancer  with mets to bones, dx  s/p Brachytherapy and Lupron (stopped in )    Glaucoma    Hyperlipidemia    HTN (Hypertension)    Diabetes Mellitus Type II  on insulin    H/O nephrostomy  and Laser lithotripsy.     Carpal tunnel syndrome  right wrist     H/O malignant melanoma of skin right ear, excision of neoplasm 2011    History of radiation therapy  for prostate cancer , brachytherapy    Macular Pucker Left  surgery 2010    History of Cystoscopy multiple  urethral dilation, intravesical anesthetic    Lipoma resection from stomach in       Review of Systems:   Negative except as above.     Allergies    No Known Allergies    Social History: Former smoker but quit in  as above    FAMILY HISTORY:  Family history of stroke    Family history of cardiac disorder    MEDICATIONS  (STANDING):  allopurinol 150 milliGRAM(s) Oral daily  carvedilol 12.5 milliGRAM(s) Oral every 12 hours  dextrose 5%. 1000 milliLiter(s) (50 mL/Hr) IV Continuous <Continuous>  dextrose 50% Injectable 12.5 Gram(s) IV Push once  dextrose 50% Injectable 25 Gram(s) IV Push once  dextrose 50% Injectable 25 Gram(s) IV Push once  heparin   Injectable 5000 Unit(s) SubCutaneous every 8 hours  influenza   Vaccine 0.5 milliLiter(s) IntraMuscular once  insulin glargine Injectable (LANTUS) 12 Unit(s) SubCutaneous at bedtime  insulin lispro (HumaLOG) corrective regimen sliding scale   SubCutaneous three times a day before meals  insulin lispro (HumaLOG) corrective regimen sliding scale   SubCutaneous at bedtime  sodium chloride 0.9%. 1000 milliLiter(s) (100 mL/Hr) IV Continuous <Continuous>    MEDICATIONS  (PRN):  acetaminophen   Tablet .. 650 milliGRAM(s) Oral every 6 hours PRN Temp greater or equal to 38C (100.4F), Mild Pain (1 - 3)  dextrose 40% Gel 15 Gram(s) Oral once PRN Blood Glucose LESS THAN 70 milliGRAM(s)/deciliter  glucagon  Injectable 1 milliGRAM(s) IntraMuscular once PRN Glucose LESS THAN 70 milligrams/deciliter  ondansetron Injectable 4 milliGRAM(s) IV Push every 6 hours PRN Nausea and/or Vomiting  oxycodone    5 mG/acetaminophen 325 mG 1 Tablet(s) Oral every 4 hours PRN Moderate Pain (4 - 6)  oxycodone    5 mG/acetaminophen 325 mG 2 Tablet(s) Oral every 6 hours PRN Severe Pain (7 - 10)  senna 2 Tablet(s) Oral at bedtime PRN Constipation    CAPILLARY BLOOD GLUCOSE      POCT Blood Glucose.: 126 mg/dL (07 Oct 2020 21:31)    I&O's Summary    Vital Signs Last 24 Hrs  T(C): 36.8 (07 Oct 2020 20:49), Max: 36.9 (07 Oct 2020 14:23)  T(F): 98.2 (07 Oct 2020 20:49), Max: 98.5 (07 Oct 2020 14:23)  HR: 66 (07 Oct 2020 20:49) (66 - 80)  BP: 159/76 (07 Oct 2020 20:49) (123/59 - 159/76)  BP(mean): --  RR: 18 (07 Oct 2020 20:49) (16 - 18)  SpO2: 92% (07 Oct 2020 20:49) (92% - 98%)    PHYSICAL EXAM:  GENERAL: NAD, well-developed  HEAD:  Atraumatic, Normocephalic  EYES: EOMI, PERRLA, conjunctiva and sclera clear  NECK: Supple, No JVD  CHEST/LUNG: Clear to auscultation bilaterally; No wheeze  HEART: Regular rate and rhythm; No murmurs, rubs, or gallops  ABDOMEN: Soft, Nontender, Nondistended; Bowel sounds present  EXTREMITIES:  2+ Peripheral Pulses, No clubbing, cyanosis, or edema  : Higgins catheter in place and draining cloudy urine, there is some slight leaking around catheter at glans. Scrotal swelling and pain.  PSYCH: AAOx3  NEUROLOGY: non-focal  SKIN: No rashes or lesions    LABS:                        9.7    6.75  )-----------( 195      ( 07 Oct 2020 17:16 )             30.9     10-07    142  |  109<H>  |  58<H>  ----------------------------<  103<H>  4.8   |  20<L>  |  1.77<H>    Ca    9.8      07 Oct 2020 17:16    TPro  7.8  /  Alb  3.8  /  TBili  0.3  /  DBili  x   /  AST  11  /  ALT  11  /  AlkPhos  89  10-07    PT/INR - ( 07 Oct 2020 17:16 )   PT: 12.4 sec;   INR: 1.04 ratio         PTT - ( 07 Oct 2020 17:16 )  PTT:30.7 sec      Urinalysis Basic - ( 07 Oct 2020 17:30 )    Color: Light Yellow / Appearance: Slightly Turbid / S.015 / pH: x  Gluc: x / Ketone: Negative  / Bili: Negative / Urobili: Negative   Blood: x / Protein: 30 mg/dL / Nitrite: Negative   Leuk Esterase: Large / RBC: 8 /hpf /  /HPF   Sq Epi: x / Non Sq Epi: 0 /hpf / Bacteria: Negative        RADIOLOGY & ADDITIONAL TESTS:  CT A/P: Edema and cellulitis along the scrotum and base of the penis without drainable abscess collection. Indeterminate focal lingular consolidation.  EKG ordered and personally reviewed: NSR without acute cardiac findings

## 2020-10-07 NOTE — CONSULT NOTE ADULT - PROBLEM SELECTOR RECOMMENDATION 4
Patient reports no longer using rosuvastatin due to statin myopathy.  Will need to clarify which statin he is now using, and can resume this post op.

## 2020-10-07 NOTE — ED ADULT NURSE NOTE - OBJECTIVE STATEMENT
81 yo M w/ PMHx of prostate CA (w/ stricture), HTN, glaucoma, HLD, DM presents to ED via waiting room c/o penile pain/swelling and "infection of hardware". Pt states he saw his urologist who advised him to come to ED for further eval. Pt reports implant from previous urology procedure is experiencing increasing pain around the area, worse on palpation today at urologists office. States he intermittently self catheterizes at home, has had increasing difficulty passing catheter over past few days. Passing small amounts of urine w/o catheterizing. States over past week has noticed urine has been becoming increasingly cloudy. Pt denies any CP, SOB, cough, N/V, fever, chills, constipation, diarrhea, HA, dizziness, weakness, hematuria. Pt A&Ox4, lungs CTA, +central pulses. Abdomen soft, not tender, not distended. Ambulating w/ steady gait, safety and comfort maintained, no acute distress noted at this time. Pt denies any recent travel or known sick contacts.

## 2020-10-07 NOTE — H&P ADULT - NSHPLABSRESULTS_GEN_ALL_CORE
9.7    6.75  )-----------( 195      ( 07 Oct 2020 17:16 )             30.9     10-    142  |  109<H>  |  58<H>  ----------------------------<  103<H>  4.8   |  20<L>  |  1.77<H>    Ca    9.8      07 Oct 2020 17:16    TPro  7.8  /  Alb  3.8  /  TBili  0.3  /  DBili  x   /  AST  11  /  ALT  11  /  AlkPhos  89  10-    Urinalysis Basic - ( 07 Oct 2020 17:30 )    Color: Light Yellow / Appearance: Slightly Turbid / S.015 / pH: x  Gluc: x / Ketone: Negative  / Bili: Negative / Urobili: Negative   Blood: x / Protein: 30 mg/dL / Nitrite: Negative   Leuk Esterase: Large / RBC: 8 /hpf /  /HPF   Sq Epi: x / Non Sq Epi: 0 /hpf / Bacteria: Negative      < from: CT Abdomen and Pelvis No Cont (10.07.20 @ 17:50) >    IMPRESSION:    Edema and cellulitis along the scrotum and base of the penis without drainable abscess collection.    Trace pericardial fluid.    New focal lingular consolidation when compared with prior exam of May 26, 2016 which is indeterminate. Consider 3 month follow-up chest CT for this finding.    < end of copied text >

## 2020-10-07 NOTE — CONSULT NOTE ADULT - PROBLEM SELECTOR RECOMMENDATION 2
Continue CPAP for now  Careful respiratory monitoring post op given prior issues with oxygenation post op, likely will require cpap/bipap post op.

## 2020-10-07 NOTE — ED ADULT NURSE REASSESSMENT NOTE - NS ED NURSE REASSESS COMMENT FT1
Urology at bedside to place flanagan catheter. VSS. Will continue to reassess. COVID swab and type and screen obtained and sent to lab.

## 2020-10-07 NOTE — H&P ADULT - ASSESSMENT
81 y/o M w/ PMHx of HTN, glaucoma, HLD, DM, BESSIE, metastatic prostate ca treated with radiation in 2011 c/b urethral strictures requiring multiple dilations last on 8/27/2020, incontinence s/p AUS in 2015 now with infected AUS

## 2020-10-07 NOTE — ED PROVIDER NOTE - OBJECTIVE STATEMENT
Agree c stat doc note above.  Also h/o DM2 (insulin dependent), HTN, hyperlipidemia, sleep apnea.  Recent manipulation of scrotal hardware in urologist's office after pt noted more difficulty passing urine; patient endorses self catheterization multiple times over the last few days, now states starting yesterday had cloudy urine, worsening scrotal pain, and swelling.  No f/c but endorses malaise.  No back pain or abd pain.  No rectal pain.  No n/v/d.  No other complaints.  Has baseline exertional dypsnea that has not changed recently.

## 2020-10-07 NOTE — CONSULT NOTE ADULT - ASSESSMENT
79 y/o M w/ PMHx of HTN, glaucoma, HLD, DM2, CKD, BESSIE, metastatic prostate ca treated with radiation in 2011 c/b urethral strictures requiring multiple dilations last on 8/27/2020, incontinence s/p AUS in 2015 now presenting for extraction of infected AUS

## 2020-10-07 NOTE — H&P ADULT - NSHPPHYSICALEXAM_GEN_ALL_CORE
Gen: NAD  Pulm: No respiratory distress, no subcostal retractions  CV: RRR, no JVD  Abd: Soft, NT, ND  Back: No CVAT  : Circumcised, no lesions.  No discharge or blood at urethral meatus.  Testes descended bilaterally.  Testes and epididymis nontender bilaterally.  Right scrotum slightly erythematous and tenderness over AUS pump

## 2020-10-07 NOTE — ED ADULT NURSE NOTE - PRO INTERPRETER NEED 2
Good fetal movement  Feeling well, no contractions  Abdomen nontender, no edema, skin dry  GBS negative  US for size > dates next visit  Labor instructions    English

## 2020-10-07 NOTE — ED PROVIDER NOTE - RAPID ASSESSMENT
80M with ho prostate CA, c/b stricture (urologist Dr. Grant) sp procedure in 8/2020 presenting with penile swelling and "infection of hardware". Passing a little bit of urine. Intermittently self-catheterizes at home. Harder to pass catheter today but was able to pass it. No hematuria.  Urine is cloudy. No fevers or chills.      Urology Dr. Grant  PMD: none          I, Kalin López MD, performed an initial face to face bedside interview with this patient regarding history of present illness and determined that the patient should be evaluated in the main ED. This patient's evaluation is  NOT COMPLETE and only preliminary. The full assessment and management of this patient is deferred to the main ED provider.

## 2020-10-07 NOTE — H&P ADULT - HISTORY OF PRESENT ILLNESS
81 y/o M w/ PMHx of HTN, glaucoma, HLD, DM, BESSIE, metastatic prostate ca treated with radiation in 2011 c/b urethral strictures requiring multiple dilations last on 8/27/2020, incontinence s/p AUS in 2015 presents to ED c/o scrotal pain and swelling around AUS pump worsening since yesterday. Pt normally self catheterizes at home but reports some difficulty passing flanagan for a couple of days.  Pt denies F/C/N/V, dysuria, urgency, frequency or gross hematuria.

## 2020-10-08 ENCOUNTER — APPOINTMENT (OUTPATIENT)
Dept: FAMILY MEDICINE | Facility: CLINIC | Age: 80
End: 2020-10-08

## 2020-10-08 ENCOUNTER — RESULT REVIEW (OUTPATIENT)
Age: 80
End: 2020-10-08

## 2020-10-08 LAB
A1C WITH ESTIMATED AVERAGE GLUCOSE RESULT: 7.2 % — HIGH (ref 4–5.6)
ANION GAP SERPL CALC-SCNC: 12 MMOL/L — SIGNIFICANT CHANGE UP (ref 5–17)
BASOPHILS # BLD AUTO: 0.01 K/UL — SIGNIFICANT CHANGE UP (ref 0–0.2)
BASOPHILS NFR BLD AUTO: 0.2 % — SIGNIFICANT CHANGE UP (ref 0–2)
BUN SERPL-MCNC: 52 MG/DL — HIGH (ref 7–23)
CALCIUM SERPL-MCNC: 9.1 MG/DL — SIGNIFICANT CHANGE UP (ref 8.4–10.5)
CHLORIDE SERPL-SCNC: 109 MMOL/L — HIGH (ref 96–108)
CO2 SERPL-SCNC: 20 MMOL/L — LOW (ref 22–31)
CREAT SERPL-MCNC: 1.8 MG/DL — HIGH (ref 0.5–1.3)
EOSINOPHIL # BLD AUTO: 0.14 K/UL — SIGNIFICANT CHANGE UP (ref 0–0.5)
EOSINOPHIL NFR BLD AUTO: 3 % — SIGNIFICANT CHANGE UP (ref 0–6)
ESTIMATED AVERAGE GLUCOSE: 160 MG/DL — HIGH (ref 68–114)
GLUCOSE BLDC GLUCOMTR-MCNC: 118 MG/DL — HIGH (ref 70–99)
GLUCOSE BLDC GLUCOMTR-MCNC: 153 MG/DL — HIGH (ref 70–99)
GLUCOSE BLDC GLUCOMTR-MCNC: 166 MG/DL — HIGH (ref 70–99)
GLUCOSE BLDC GLUCOMTR-MCNC: 166 MG/DL — HIGH (ref 70–99)
GLUCOSE BLDC GLUCOMTR-MCNC: 73 MG/DL — SIGNIFICANT CHANGE UP (ref 70–99)
GLUCOSE SERPL-MCNC: 101 MG/DL — HIGH (ref 70–99)
HCT VFR BLD CALC: 26.9 % — LOW (ref 39–50)
HGB BLD-MCNC: 8.1 G/DL — LOW (ref 13–17)
IMM GRANULOCYTES NFR BLD AUTO: 0.4 % — SIGNIFICANT CHANGE UP (ref 0–1.5)
LYMPHOCYTES # BLD AUTO: 1.51 K/UL — SIGNIFICANT CHANGE UP (ref 1–3.3)
LYMPHOCYTES # BLD AUTO: 32.5 % — SIGNIFICANT CHANGE UP (ref 13–44)
MCHC RBC-ENTMCNC: 29.2 PG — SIGNIFICANT CHANGE UP (ref 27–34)
MCHC RBC-ENTMCNC: 30.1 GM/DL — LOW (ref 32–36)
MCV RBC AUTO: 97.1 FL — SIGNIFICANT CHANGE UP (ref 80–100)
MONOCYTES # BLD AUTO: 0.29 K/UL — SIGNIFICANT CHANGE UP (ref 0–0.9)
MONOCYTES NFR BLD AUTO: 6.3 % — SIGNIFICANT CHANGE UP (ref 2–14)
NEUTROPHILS # BLD AUTO: 2.67 K/UL — SIGNIFICANT CHANGE UP (ref 1.8–7.4)
NEUTROPHILS NFR BLD AUTO: 57.6 % — SIGNIFICANT CHANGE UP (ref 43–77)
NRBC # BLD: 0 /100 WBCS — SIGNIFICANT CHANGE UP (ref 0–0)
PLATELET # BLD AUTO: 158 K/UL — SIGNIFICANT CHANGE UP (ref 150–400)
POTASSIUM SERPL-MCNC: 4.3 MMOL/L — SIGNIFICANT CHANGE UP (ref 3.5–5.3)
POTASSIUM SERPL-SCNC: 4.3 MMOL/L — SIGNIFICANT CHANGE UP (ref 3.5–5.3)
RBC # BLD: 2.77 M/UL — LOW (ref 4.2–5.8)
RBC # FLD: 15.6 % — HIGH (ref 10.3–14.5)
SARS-COV-2 IGG SERPL QL IA: NEGATIVE — SIGNIFICANT CHANGE UP
SARS-COV-2 IGM SERPL IA-ACNC: <0.1 INDEX — SIGNIFICANT CHANGE UP
SODIUM SERPL-SCNC: 141 MMOL/L — SIGNIFICANT CHANGE UP (ref 135–145)
WBC # BLD: 4.64 K/UL — SIGNIFICANT CHANGE UP (ref 3.8–10.5)
WBC # FLD AUTO: 4.64 K/UL — SIGNIFICANT CHANGE UP (ref 3.8–10.5)

## 2020-10-08 PROCEDURE — 53446 REMOVE URO SPHINCTER: CPT

## 2020-10-08 PROCEDURE — 88300 SURGICAL PATH GROSS: CPT | Mod: 26

## 2020-10-08 PROCEDURE — 52000 CYSTOURETHROSCOPY: CPT | Mod: 59

## 2020-10-08 PROCEDURE — 51610 INJECTION FOR BLADDER X-RAY: CPT

## 2020-10-08 RX ORDER — DEXTROSE 50 % IN WATER 50 %
12.5 SYRINGE (ML) INTRAVENOUS ONCE
Refills: 0 | Status: DISCONTINUED | OUTPATIENT
Start: 2020-10-08 | End: 2020-10-10

## 2020-10-08 RX ORDER — SENNA PLUS 8.6 MG/1
2 TABLET ORAL AT BEDTIME
Refills: 0 | Status: DISCONTINUED | OUTPATIENT
Start: 2020-10-08 | End: 2020-10-10

## 2020-10-08 RX ORDER — VANCOMYCIN HCL 1 G
1000 VIAL (EA) INTRAVENOUS EVERY 12 HOURS
Refills: 0 | Status: DISCONTINUED | OUTPATIENT
Start: 2020-10-08 | End: 2020-10-10

## 2020-10-08 RX ORDER — INSULIN LISPRO 100/ML
VIAL (ML) SUBCUTANEOUS
Refills: 0 | Status: DISCONTINUED | OUTPATIENT
Start: 2020-10-08 | End: 2020-10-10

## 2020-10-08 RX ORDER — GLUCAGON INJECTION, SOLUTION 0.5 MG/.1ML
1 INJECTION, SOLUTION SUBCUTANEOUS ONCE
Refills: 0 | Status: DISCONTINUED | OUTPATIENT
Start: 2020-10-08 | End: 2020-10-10

## 2020-10-08 RX ORDER — CARVEDILOL PHOSPHATE 80 MG/1
12.5 CAPSULE, EXTENDED RELEASE ORAL EVERY 12 HOURS
Refills: 0 | Status: DISCONTINUED | OUTPATIENT
Start: 2020-10-08 | End: 2020-10-10

## 2020-10-08 RX ORDER — ALLOPURINOL 300 MG
150 TABLET ORAL DAILY
Refills: 0 | Status: DISCONTINUED | OUTPATIENT
Start: 2020-10-08 | End: 2020-10-10

## 2020-10-08 RX ORDER — HEPARIN SODIUM 5000 [USP'U]/ML
5000 INJECTION INTRAVENOUS; SUBCUTANEOUS EVERY 8 HOURS
Refills: 0 | Status: DISCONTINUED | OUTPATIENT
Start: 2020-10-08 | End: 2020-10-10

## 2020-10-08 RX ORDER — SODIUM CHLORIDE 9 MG/ML
1000 INJECTION, SOLUTION INTRAVENOUS
Refills: 0 | Status: DISCONTINUED | OUTPATIENT
Start: 2020-10-08 | End: 2020-10-10

## 2020-10-08 RX ORDER — ACETAMINOPHEN 500 MG
650 TABLET ORAL EVERY 6 HOURS
Refills: 0 | Status: DISCONTINUED | OUTPATIENT
Start: 2020-10-08 | End: 2020-10-10

## 2020-10-08 RX ORDER — ALLOPURINOL 300 MG
0 TABLET ORAL
Qty: 0 | Refills: 0 | DISCHARGE

## 2020-10-08 RX ORDER — CEFTRIAXONE 500 MG/1
1000 INJECTION, POWDER, FOR SOLUTION INTRAMUSCULAR; INTRAVENOUS EVERY 24 HOURS
Refills: 0 | Status: DISCONTINUED | OUTPATIENT
Start: 2020-10-08 | End: 2020-10-09

## 2020-10-08 RX ORDER — INSULIN GLARGINE 100 [IU]/ML
12 INJECTION, SOLUTION SUBCUTANEOUS AT BEDTIME
Refills: 0 | Status: DISCONTINUED | OUTPATIENT
Start: 2020-10-08 | End: 2020-10-09

## 2020-10-08 RX ORDER — INSULIN LISPRO 100/ML
VIAL (ML) SUBCUTANEOUS AT BEDTIME
Refills: 0 | Status: DISCONTINUED | OUTPATIENT
Start: 2020-10-08 | End: 2020-10-10

## 2020-10-08 RX ORDER — ATORVASTATIN CALCIUM 80 MG/1
40 TABLET, FILM COATED ORAL AT BEDTIME
Refills: 0 | Status: DISCONTINUED | OUTPATIENT
Start: 2020-10-08 | End: 2020-10-10

## 2020-10-08 RX ORDER — ATORVASTATIN CALCIUM 80 MG/1
40 TABLET, FILM COATED ORAL AT BEDTIME
Refills: 0 | Status: DISCONTINUED | OUTPATIENT
Start: 2020-10-08 | End: 2020-10-08

## 2020-10-08 RX ORDER — INSULIN LISPRO 100/ML
VIAL (ML) SUBCUTANEOUS EVERY 6 HOURS
Refills: 0 | Status: DISCONTINUED | OUTPATIENT
Start: 2020-10-08 | End: 2020-10-08

## 2020-10-08 RX ORDER — SODIUM CHLORIDE 9 MG/ML
1000 INJECTION INTRAMUSCULAR; INTRAVENOUS; SUBCUTANEOUS
Refills: 0 | Status: DISCONTINUED | OUTPATIENT
Start: 2020-10-08 | End: 2020-10-09

## 2020-10-08 RX ORDER — OXYCODONE AND ACETAMINOPHEN 5; 325 MG/1; MG/1
1 TABLET ORAL EVERY 6 HOURS
Refills: 0 | Status: DISCONTINUED | OUTPATIENT
Start: 2020-10-08 | End: 2020-10-10

## 2020-10-08 RX ORDER — DEXTROSE 50 % IN WATER 50 %
15 SYRINGE (ML) INTRAVENOUS ONCE
Refills: 0 | Status: DISCONTINUED | OUTPATIENT
Start: 2020-10-08 | End: 2020-10-10

## 2020-10-08 RX ORDER — FENTANYL CITRATE 50 UG/ML
25 INJECTION INTRAVENOUS
Refills: 0 | Status: DISCONTINUED | OUTPATIENT
Start: 2020-10-08 | End: 2020-10-08

## 2020-10-08 RX ORDER — ONDANSETRON 8 MG/1
4 TABLET, FILM COATED ORAL ONCE
Refills: 0 | Status: DISCONTINUED | OUTPATIENT
Start: 2020-10-08 | End: 2020-10-08

## 2020-10-08 RX ORDER — INSULIN DETEMIR 100/ML (3)
24 INSULIN PEN (ML) SUBCUTANEOUS
Qty: 0 | Refills: 0 | DISCHARGE

## 2020-10-08 RX ORDER — DEXTROSE 50 % IN WATER 50 %
25 SYRINGE (ML) INTRAVENOUS ONCE
Refills: 0 | Status: DISCONTINUED | OUTPATIENT
Start: 2020-10-08 | End: 2020-10-10

## 2020-10-08 RX ORDER — ROSUVASTATIN CALCIUM 5 MG/1
1 TABLET ORAL
Qty: 0 | Refills: 0 | DISCHARGE

## 2020-10-08 RX ADMIN — INSULIN GLARGINE 12 UNIT(S): 100 INJECTION, SOLUTION SUBCUTANEOUS at 23:24

## 2020-10-08 RX ADMIN — HEPARIN SODIUM 5000 UNIT(S): 5000 INJECTION INTRAVENOUS; SUBCUTANEOUS at 05:27

## 2020-10-08 RX ADMIN — CARVEDILOL PHOSPHATE 12.5 MILLIGRAM(S): 80 CAPSULE, EXTENDED RELEASE ORAL at 05:27

## 2020-10-08 RX ADMIN — HEPARIN SODIUM 5000 UNIT(S): 5000 INJECTION INTRAVENOUS; SUBCUTANEOUS at 13:06

## 2020-10-08 RX ADMIN — Medication 150 MILLIGRAM(S): at 12:22

## 2020-10-08 RX ADMIN — SODIUM CHLORIDE 100 MILLILITER(S): 9 INJECTION INTRAMUSCULAR; INTRAVENOUS; SUBCUTANEOUS at 21:03

## 2020-10-08 RX ADMIN — HEPARIN SODIUM 5000 UNIT(S): 5000 INJECTION INTRAVENOUS; SUBCUTANEOUS at 21:02

## 2020-10-08 RX ADMIN — Medication 1: at 12:22

## 2020-10-08 NOTE — PROGRESS NOTE ADULT - SUBJECTIVE AND OBJECTIVE BOX
Post op Check    Pt seen and examined without complaints. Pain is controlled. Denies SOB/CP/N/V.     Vital Signs Last 24 Hrs  T(C): 36.5 (08 Oct 2020 21:30), Max: 37.3 (08 Oct 2020 20:20)  T(F): 97.7 (08 Oct 2020 21:30), Max: 99.1 (08 Oct 2020 20:20)  HR: 83 (08 Oct 2020 21:45) (72 - 86)  BP: 129/60 (08 Oct 2020 21:45) (115/69 - 168/76)  BP(mean): 87 (08 Oct 2020 21:45) (86 - 103)  RR: 16 (08 Oct 2020 21:45) (14 - 18)  SpO2: 95% (08 Oct 2020 21:45) (93% - 96%)    I&O's Summary    07 Oct 2020 07:01  -  08 Oct 2020 07:00  --------------------------------------------------------  IN: 1320 mL / OUT: 1350 mL / NET: -30 mL    08 Oct 2020 07:01  -  08 Oct 2020 22:47  --------------------------------------------------------  IN: 220 mL / OUT: 990 mL / NET: -770 mL        Physical Exam  Gen: NAD  Abd: Soft, NT, ND JEREMIAS in place, serosanguinous output  Back: No CVAT b/l  : suprapubic and perineal dressings in place, intact and dry                          8.1    4.64  )-----------( 158      ( 08 Oct 2020 06:22 )             26.9       10-08    141  |  109<H>  |  52<H>  ----------------------------<  101<H>  4.3   |  20<L>  |  1.80<H>    Ca    9.1      08 Oct 2020 06:22    TPro  7.8  /  Alb  3.8  /  TBili  0.3  /  DBili  x   /  AST  11  /  ALT  11  /  AlkPhos  89  10-07      A/P: 80y Male s/p removal of infected artificial urinary sphincter    -DVT prophylaxis/OOB  -Incentive spirometry  -Strict I&O's  -Analgesia and antiemetics as needed  -Diet  -AM labs

## 2020-10-08 NOTE — PROGRESS NOTE ADULT - ASSESSMENT
79 y/o M w/ PMHx of HTN, glaucoma, HLD, DM, BESSIE, metastatic prostate ca treated with radiation in 2011 c/b urethral strictures requiring multiple dilations last on 8/27/2020, incontinence s/p AUS in 2015 now with infected AUS     npo for OR today   appreciate medicine clearance note   pain medication prn

## 2020-10-08 NOTE — BRIEF OPERATIVE NOTE - OPERATION/FINDINGS
RUG --> stricture seen in prostatic urethra, easily mary to passed flex scope, no extrav of contrast concerning for fistula  explant of infected AUS + reservoir + pump

## 2020-10-08 NOTE — BRIEF OPERATIVE NOTE - NSICDXBRIEFPOSTOP_GEN_ALL_CORE_FT
POST-OP DIAGNOSIS:  Scrotal infection 08-Oct-2020 21:50:43  Heather Rivera  Urethral stricture 08-Oct-2020 21:49:10  Heather Rivera

## 2020-10-08 NOTE — BRIEF OPERATIVE NOTE - NSICDXBRIEFPREOP_GEN_ALL_CORE_FT
PRE-OP DIAGNOSIS:  Urethral stricture 08-Oct-2020 21:51:05  Heather Rivera  Scrotal infection 08-Oct-2020 21:50:55  Heather Rivera

## 2020-10-08 NOTE — BRIEF OPERATIVE NOTE - NSICDXBRIEFPROCEDURE_GEN_ALL_CORE_FT
PROCEDURES:  Cystoscopy, with retrograde urethrography 08-Oct-2020 21:48:54  Heather Rivera  Removal, artificial urinary sphincter 08-Oct-2020 21:48:42  Heather Rivera

## 2020-10-09 ENCOUNTER — TRANSCRIPTION ENCOUNTER (OUTPATIENT)
Age: 80
End: 2020-10-09

## 2020-10-09 LAB
ANION GAP SERPL CALC-SCNC: 9 MMOL/L — SIGNIFICANT CHANGE UP (ref 5–17)
BUN SERPL-MCNC: 39 MG/DL — HIGH (ref 7–23)
CALCIUM SERPL-MCNC: 8.6 MG/DL — SIGNIFICANT CHANGE UP (ref 8.4–10.5)
CHLORIDE SERPL-SCNC: 109 MMOL/L — HIGH (ref 96–108)
CO2 SERPL-SCNC: 23 MMOL/L — SIGNIFICANT CHANGE UP (ref 22–31)
CREAT SERPL-MCNC: 1.71 MG/DL — HIGH (ref 0.5–1.3)
GLUCOSE BLDC GLUCOMTR-MCNC: 106 MG/DL — HIGH (ref 70–99)
GLUCOSE BLDC GLUCOMTR-MCNC: 153 MG/DL — HIGH (ref 70–99)
GLUCOSE BLDC GLUCOMTR-MCNC: 193 MG/DL — HIGH (ref 70–99)
GLUCOSE BLDC GLUCOMTR-MCNC: 227 MG/DL — HIGH (ref 70–99)
GLUCOSE SERPL-MCNC: 133 MG/DL — HIGH (ref 70–99)
HCT VFR BLD CALC: 26.6 % — LOW (ref 39–50)
HGB BLD-MCNC: 7.9 G/DL — LOW (ref 13–17)
MCHC RBC-ENTMCNC: 29 PG — SIGNIFICANT CHANGE UP (ref 27–34)
MCHC RBC-ENTMCNC: 29.7 GM/DL — LOW (ref 32–36)
MCV RBC AUTO: 97.8 FL — SIGNIFICANT CHANGE UP (ref 80–100)
NRBC # BLD: 0 /100 WBCS — SIGNIFICANT CHANGE UP (ref 0–0)
PLATELET # BLD AUTO: 151 K/UL — SIGNIFICANT CHANGE UP (ref 150–400)
POTASSIUM SERPL-MCNC: 4.5 MMOL/L — SIGNIFICANT CHANGE UP (ref 3.5–5.3)
POTASSIUM SERPL-SCNC: 4.5 MMOL/L — SIGNIFICANT CHANGE UP (ref 3.5–5.3)
RBC # BLD: 2.72 M/UL — LOW (ref 4.2–5.8)
RBC # FLD: 15.5 % — HIGH (ref 10.3–14.5)
SODIUM SERPL-SCNC: 141 MMOL/L — SIGNIFICANT CHANGE UP (ref 135–145)
WBC # BLD: 5.03 K/UL — SIGNIFICANT CHANGE UP (ref 3.8–10.5)
WBC # FLD AUTO: 5.03 K/UL — SIGNIFICANT CHANGE UP (ref 3.8–10.5)

## 2020-10-09 RX ORDER — SENNA PLUS 8.6 MG/1
2 TABLET ORAL
Qty: 0 | Refills: 0 | DISCHARGE
Start: 2020-10-09

## 2020-10-09 RX ORDER — INSULIN GLARGINE 100 [IU]/ML
24 INJECTION, SOLUTION SUBCUTANEOUS AT BEDTIME
Refills: 0 | Status: DISCONTINUED | OUTPATIENT
Start: 2020-10-09 | End: 2020-10-10

## 2020-10-09 RX ADMIN — Medication 650 MILLIGRAM(S): at 14:35

## 2020-10-09 RX ADMIN — Medication 650 MILLIGRAM(S): at 22:00

## 2020-10-09 RX ADMIN — Medication 250 MILLIGRAM(S): at 06:10

## 2020-10-09 RX ADMIN — INSULIN GLARGINE 24 UNIT(S): 100 INJECTION, SOLUTION SUBCUTANEOUS at 21:36

## 2020-10-09 RX ADMIN — Medication 150 MILLIGRAM(S): at 11:22

## 2020-10-09 RX ADMIN — HEPARIN SODIUM 5000 UNIT(S): 5000 INJECTION INTRAVENOUS; SUBCUTANEOUS at 06:10

## 2020-10-09 RX ADMIN — Medication 2: at 09:33

## 2020-10-09 RX ADMIN — Medication 250 MILLIGRAM(S): at 17:58

## 2020-10-09 RX ADMIN — CARVEDILOL PHOSPHATE 12.5 MILLIGRAM(S): 80 CAPSULE, EXTENDED RELEASE ORAL at 17:21

## 2020-10-09 RX ADMIN — HEPARIN SODIUM 5000 UNIT(S): 5000 INJECTION INTRAVENOUS; SUBCUTANEOUS at 13:24

## 2020-10-09 RX ADMIN — Medication 650 MILLIGRAM(S): at 06:42

## 2020-10-09 RX ADMIN — HEPARIN SODIUM 5000 UNIT(S): 5000 INJECTION INTRAVENOUS; SUBCUTANEOUS at 21:36

## 2020-10-09 RX ADMIN — Medication 1: at 19:03

## 2020-10-09 RX ADMIN — Medication 650 MILLIGRAM(S): at 06:11

## 2020-10-09 RX ADMIN — Medication 650 MILLIGRAM(S): at 13:24

## 2020-10-09 RX ADMIN — Medication 650 MILLIGRAM(S): at 22:31

## 2020-10-09 RX ADMIN — ATORVASTATIN CALCIUM 40 MILLIGRAM(S): 80 TABLET, FILM COATED ORAL at 21:36

## 2020-10-09 RX ADMIN — CEFTRIAXONE 100 MILLIGRAM(S): 500 INJECTION, POWDER, FOR SOLUTION INTRAMUSCULAR; INTRAVENOUS at 17:21

## 2020-10-09 RX ADMIN — CARVEDILOL PHOSPHATE 12.5 MILLIGRAM(S): 80 CAPSULE, EXTENDED RELEASE ORAL at 06:10

## 2020-10-09 NOTE — DISCHARGE NOTE PROVIDER - HOSPITAL COURSE
81 y/o M w/ PMHx of HTN, glaucoma, HLD, DM, BESSIE, metastatic prostate ca treated with radiation in 2011 c/b urethral strictures requiring multiple dilations last on 8/27/2020, incontinence s/p AUS in 2015 presents to ED c/o scrotal pain and swelling around AUS pump worsening since yesterday. Pt normally self catheterizes at home but reports some difficulty passing flanagan for a couple of days.  Pt denies F/C/N/V, dysuria, urgency, frequency or gross hematuria.     He was taken to the OR on 10/8 for removal of the AUS. Post op he had a flanagan catheter, and JEREMIAS drain.  His culture showed Enterococcus.  His JEREMIAS drain   He was discharged with flanagan catheter and antibiotics.  At the time of discharge, the patient was hemodynamically stable, was tolerating PO diet, was voiding urine and passing stool, was ambulating, and was comfortable with adequate pain control.

## 2020-10-09 NOTE — DISCHARGE NOTE PROVIDER - NSDCCPCAREPLAN_GEN_ALL_CORE_FT
PRINCIPAL DISCHARGE DIAGNOSIS  Diagnosis: Malfunction of artificial urethral sphincter  Assessment and Plan of Treatment: Please follow up with Dr. Grant on Tuesday.  Call [phone] to schedule/confirm your appointment.  Call or follow up sooner with fevers, chills, nausea, vomiting, increasing pain, or with other concerns.         PRINCIPAL DISCHARGE DIAGNOSIS  Diagnosis: Malfunction of artificial urethral sphincter  Assessment and Plan of Treatment: Please follow up with Dr. Grant on Tuesday for tube removal .  Call [phone] to schedule/confirm your appointment.  Call or follow up sooner with fevers, chills, nausea, vomiting, increasing pain, or with other concerns.  Please complete your antibiotic course   You will be going home with your flanagan and drain. Please care for it as you have been instructed.         SECONDARY DISCHARGE DIAGNOSES  Diagnosis: HTN (hypertension)  Assessment and Plan of Treatment: continue home medications    Diagnosis: Abnormal CT scan, lung  Assessment and Plan of Treatment: continue to follow up with your primary care doctor to keep monitoring your lung ct scan

## 2020-10-09 NOTE — DISCHARGE NOTE PROVIDER - NSDCMRMEDTOKEN_GEN_ALL_CORE_FT
allopurinol 300 mg oral tablet: alternates 0.5 tab 1 dasy, 1 tab next day  carvedilol 12.5 mg oral tablet: 1 tab(s) orally 2 times a day  glimepiride 4 mg oral tablet:  orally 2 times a day  Levemir 100 units/mL subcutaneous solution: 24 unit(s) subcutaneous once a day (at bedtime)  oxycodone-acetaminophen 5 mg-325 mg oral tablet: 1 tab(s) orally every 6 hours, As needed, Moderate Pain (4 - 6) MDD:4  rosuvastatin 10 mg oral tablet: 1 tab(s) orally once a day  senna oral tablet: 2 tab(s) orally once a day (at bedtime), As needed, Constipation   allopurinol 300 mg oral tablet: alternates 0.5 tab 1 dasy, 1 tab next day  Augmentin 875 mg-125 mg oral tablet: 1 tab(s) orally every 12 hours   carvedilol 12.5 mg oral tablet: 1 tab(s) orally 2 times a day  glimepiride 4 mg oral tablet:  orally 2 times a day  Levemir 100 units/mL subcutaneous solution: 24 unit(s) subcutaneous once a day (at bedtime)  oxycodone-acetaminophen 5 mg-325 mg oral tablet: 1 tab(s) orally every 6 hours, As needed, Moderate Pain (4 - 6) MDD:4  rosuvastatin 10 mg oral tablet: 1 tab(s) orally once a day  senna oral tablet: 2 tab(s) orally once a day (at bedtime), As needed, Constipation

## 2020-10-09 NOTE — PROGRESS NOTE ADULT - SUBJECTIVE AND OBJECTIVE BOX
Subjective  feeling well without complaints   Objective    Vital signs  T(F): , Max: 99.1 (10-08-20 @ 20:20)  HR: 78 (10-09-20 @ 05:44)  BP: 158/77 (10-09-20 @ 05:44)  SpO2: 98% (10-09-20 @ 05:44)  Wt(kg): --    Output     10-07 @ 07:01  -  10-08 @ 07:00  --------------------------------------------------------  IN: 1320 mL / OUT: 1350 mL / NET: -30 mL    10-08 @ 07:01  -  10-09 @ 06:58  --------------------------------------------------------  IN: 1450 mL / OUT: 1935 mL / NET: -485 mL        Gen awake alert nad axox3  Abd obese soft ntnd   Back no cvat bl    scrotal support in place, incision  c/d/i  flanagan in place yellow urine      Labs      10-08 @ 06:22    WBC 4.64  / Hct 26.9  / SCr 1.80     10-07 @ 17:16    WBC 6.75  / Hct 30.9  / SCr 1.77       Urine Cx: Culture - Blood (10.07.20 @ 22:03)    Specimen Source: .Blood Blood-Peripheral    Culture Results:   No growth to date.        Culture - Urine (10.07.20 @ 21:06)    Specimen Source: .Urine Clean Catch (Midstream)    Culture Results:   >100,000 CFU/ml Enterococcus species  10,000 - 49,000 CFU/mL Coag Negative Staphylococcus "Susceptibilities not  performed"      Imaging  < from: CT Abdomen and Pelvis No Cont (10.07.20 @ 17:50) >  FINDINGS:  LOWER CHEST:  Lingular opacity measures 2.1 x 1.7 cm (4, 19). Mild emphysema. Right basilar dependent atelectasis. Small pericardial effusion. Coronary artery and aortic valve calcifications. Redemonstrated ectatic ascending aorta measures up to 4.4 cm.    LIVER: Within normal limits.  BILE DUCTS: Normal caliber.  GALLBLADDER: Cholelithiasis.  SPLEEN: Within normal limits.  PANCREAS: Within normal limits.  ADRENALS: Within normal limits.  KIDNEYS/URETERS: Nonspecific bilateral renal stranding. 1.4 cm right renal lesion measures simple fluid density, likely cyst. 1.2 cm left renal cyst (10HU).    BLADDER: Within normal limits.  REPRODUCTIVE ORGANS: Penile implant with surrounding soft tissue and scrotal cellulitis. No drainable abscess. Balloon within the left lower hemipelvis. Prostatic seeds.    BOWEL: No bowel obstruction. Colonic diverticulosis without acute diverticulitis.  PERITONEUM: No ascites.  VESSELS: Within normal limits.  RETROPERITONEUM/LYMPH NODES: No lymphadenopathy.  ABDOMINAL WALL: Inflammatory change in the lower pelvis/abdominal wall. No drainable abscess.  BONES: Within normal limits.    IMPRESSION:    Edema and cellulitis along the scrotum and base of the penis without drainable abscess collection.    Trace pericardial fluid.    New focal lingular consolidation when compared with prior exam of May 26, 2016 which is indeterminate. Consider 3 month follow-up chest CT for this finding.    < end of copied text >

## 2020-10-09 NOTE — PROVIDER CONTACT NOTE (OTHER) - ASSESSMENT
Pt A&Ox4, VSS except HTN and mild temp. Pt denies pain but c/o of scrotal discomfort and suprapubic pressure. Denies chills, diaphoresis, SOB or other distress. No urine output in flanagan catheter even after respositioning; pt endorsed a recent bladder spasm where urine leaks around catheter.

## 2020-10-09 NOTE — PROVIDER CONTACT NOTE (OTHER) - SITUATION
Pt's flanagan not draining and complaining of some suprapubic pressure; BP elevated (177/77) and temp elevated (100.2 oral).

## 2020-10-09 NOTE — DISCHARGE NOTE PROVIDER - NSDCFUSCHEDAPPT_GEN_ALL_CORE_FT
DEANDRA POWERS ; 10/21/2020 ; [facility] [address] DEANDRA Jack ; 11/06/2020 ; [facility] [address] [facility]  DEANDRA POWERS ; 11/09/2020 ; [facility] [address] [facility] DEANDRA POWERS ; 11/06/2020 ; [facility] [address] [facility]  DEANDRA POWERS ; 11/09/2020 ; [facility] [address]

## 2020-10-09 NOTE — DISCHARGE NOTE PROVIDER - CARE PROVIDER_API CALL
Braden Grant  UROLOGY  [address] [place] [address], [place] [address]  Phone: [phone]  Fax: [phone]  Follow Up Time:

## 2020-10-09 NOTE — PROVIDER CONTACT NOTE (OTHER) - REASON
Pt's flanagan not draining and complaining of some suprapubic pressure; BP elevated and temp elevated.

## 2020-10-09 NOTE — PROGRESS NOTE ADULT - ASSESSMENT
79 y/o M w/ PMHx of HTN, glaucoma, HLD, DM, BESSIE, metastatic prostate ca treated with radiation in 2011 c/b urethral strictures requiring multiple dilations last on 8/27/2020, incontinence s/p AUS in 2015 now with infected AUS s/p explant 10/8/2020    awaiting cx and sensit.  ? drain and flanagan plan   pt states lung findings are established and well followed

## 2020-10-09 NOTE — PROVIDER CONTACT NOTE (OTHER) - RECOMMENDATIONS
Please come assess pt. Will repeat VS and continue to monitor urine output. Please come assess pt. Pt is very upset since there has been an issue with his catheter since 1900. Will repeat VS and continue to monitor urine output.

## 2020-10-10 ENCOUNTER — TRANSCRIPTION ENCOUNTER (OUTPATIENT)
Age: 80
End: 2020-10-10

## 2020-10-10 VITALS
DIASTOLIC BLOOD PRESSURE: 56 MMHG | RESPIRATION RATE: 18 BRPM | OXYGEN SATURATION: 95 % | HEART RATE: 90 BPM | SYSTOLIC BLOOD PRESSURE: 111 MMHG | TEMPERATURE: 98 F

## 2020-10-10 LAB
-  AMPICILLIN: SIGNIFICANT CHANGE UP
-  CIPROFLOXACIN: SIGNIFICANT CHANGE UP
-  LEVOFLOXACIN: SIGNIFICANT CHANGE UP
-  NITROFURANTOIN: SIGNIFICANT CHANGE UP
-  TETRACYCLINE: SIGNIFICANT CHANGE UP
-  VANCOMYCIN: SIGNIFICANT CHANGE UP
CULTURE RESULTS: SIGNIFICANT CHANGE UP
GLUCOSE BLDC GLUCOMTR-MCNC: 186 MG/DL — HIGH (ref 70–99)
METHOD TYPE: SIGNIFICANT CHANGE UP
ORGANISM # SPEC MICROSCOPIC CNT: SIGNIFICANT CHANGE UP
ORGANISM # SPEC MICROSCOPIC CNT: SIGNIFICANT CHANGE UP
SPECIMEN SOURCE: SIGNIFICANT CHANGE UP

## 2020-10-10 PROCEDURE — 99238 HOSP IP/OBS DSCHRG MGMT 30/<: CPT

## 2020-10-10 RX ADMIN — CARVEDILOL PHOSPHATE 12.5 MILLIGRAM(S): 80 CAPSULE, EXTENDED RELEASE ORAL at 06:27

## 2020-10-10 RX ADMIN — Medication 150 MILLIGRAM(S): at 11:36

## 2020-10-10 RX ADMIN — Medication 250 MILLIGRAM(S): at 06:27

## 2020-10-10 RX ADMIN — HEPARIN SODIUM 5000 UNIT(S): 5000 INJECTION INTRAVENOUS; SUBCUTANEOUS at 06:27

## 2020-10-10 RX ADMIN — Medication 1: at 10:00

## 2020-10-10 NOTE — PROGRESS NOTE ADULT - ATTENDING COMMENTS
Patient seen and examined. Agree with assessment and plan.    Wounds d/c/i    Higgins intact draining clear urine.  Await final cultures.
Pt seen/examined.  Case discussed with housestaff/PA team.  Agree with above note history, physical and assessment/plan.  Will await urine cx- if finalized then dc home with flanagan.  Drain may be removed pending outputs today, if scant would remove

## 2020-10-10 NOTE — PROGRESS NOTE ADULT - ASSESSMENT
80 year old man presented with infected AUS now s/p explain, recovering appropriately.    -AM vanc trough  -c/w vanc while cultures finalize  -FU urine culture Sn (enterococcus)  -dc JEREMIAS whn output <10c  -may consider TOV  -dispo planning: possibly home Sunday 80 year old man presented with infected AUS now s/p explain, recovering appropriately.    -AM vanc trough  -c/w vanc while cultures finalize  -FU urine culture Sn (enterococcus)  -dc JEREMIAS whn output <20cc/shift  -may consider TOV  -scrotal support while in bed  -dispo planning: possibly home Sat vs Sun

## 2020-10-10 NOTE — DISCHARGE NOTE NURSING/CASE MANAGEMENT/SOCIAL WORK - PATIENT PORTAL LINK FT
You can access the FollowMyHealth Patient Portal offered by [facility] by registering at the following website: [email or web address]. By joining [employer]’s FollowMyHealth portal, you will also be able to view your health information using other applications (apps) compatible with our system.

## 2020-10-10 NOTE — PROGRESS NOTE ADULT - SUBJECTIVE AND OBJECTIVE BOX
Subjective  No acute events overnight. Denies fevers, chills, nausea, vomiting, SOB, CP.  Tolerating diet.    Objective    Vital signs  T(F): , Max: 100.2 (10-09-20 @ 21:22)  HR: 88 (10-10-20 @ 05:26)  BP: 139/71 (10-10-20 @ 05:26)  SpO2: 92% (10-10-20 @ 05:26)  Wt(kg): --    Output     OUT:    Bulb (mL): 30 mL    Indwelling Catheter - Urethral (mL): 1905 mL  Total OUT: 1935 mL    Total NET: -1935 mL      OUT:    Bulb (mL): 70 mL    Indwelling Catheter - Urethral (mL): 1675 mL    Voided (mL): 775 mL  Total OUT: 2520 mL    Total NET: -2520 mL          Gen: NAD  Abd: obese, soft, ntnd   : scrotal support in place, incision c/d/i  flanagan in place yellow urine, JEREMIAS drain to bulb suction    Labs      10-09 @ 07:19    WBC 5.03  / Hct 26.6  / SCr --       10-09 @ 07:18    WBC --    / Hct --    / SCr 1.71         Culture - Fungal, Other (collected 10-09-20 @ 00:33)  Source: .Other Other  Preliminary Report (10-09-20 @ 08:31):    Testing in progress    Culture - Blood (collected 10-07-20 @ 22:03)  Source: .Blood Blood-Peripheral  Preliminary Report (10-08-20 @ 23:01):    No growth to date.    Culture - Blood (collected 10-07-20 @ 22:03)  Source: .Blood Blood-Peripheral  Preliminary Report (10-08-20 @ 23:01):    No growth to date.    Culture - Urine (collected 10-07-20 @ 21:06)  Source: .Urine Clean Catch (Midstream)  Preliminary Report (10-09-20 @ 03:35):    >100,000 CFU/ml Enterococcus species    10,000 - 49,000 CFU/mL Coag Negative Staphylococcus "Susceptibilities not    performed"      Urine Cx: Culture - Blood (10.07.20 @ 22:03)    Specimen Source: .Blood Blood-Peripheral    Culture Results:   No growth to date.        Culture - Urine (10.07.20 @ 21:06)    Specimen Source: .Urine Clean Catch (Midstream)    Culture Results:   >100,000 CFU/ml Enterococcus species  10,000 - 49,000 CFU/mL Coag Negative Staphylococcus "Susceptibilities not  performed"      Imaging  < from: CT Abdomen and Pelvis No Cont (10.07.20 @ 17:50) >  FINDINGS:  LOWER CHEST:  Lingular opacity measures 2.1 x 1.7 cm (4, 19). Mild emphysema. Right basilar dependent atelectasis. Small pericardial effusion. Coronary artery and aortic valve calcifications. Redemonstrated ectatic ascending aorta measures up to 4.4 cm.    LIVER: Within normal limits.  BILE DUCTS: Normal caliber.  GALLBLADDER: Cholelithiasis.  SPLEEN: Within normal limits.  PANCREAS: Within normal limits.  ADRENALS: Within normal limits.  KIDNEYS/URETERS: Nonspecific bilateral renal stranding. 1.4 cm right renal lesion measures simple fluid density, likely cyst. 1.2 cm left renal cyst (10HU).    BLADDER: Within normal limits.  REPRODUCTIVE ORGANS: Penile implant with surrounding soft tissue and scrotal cellulitis. No drainable abscess. Balloon within the left lower hemipelvis. Prostatic seeds.    BOWEL: No bowel obstruction. Colonic diverticulosis without acute diverticulitis.  PERITONEUM: No ascites.  VESSELS: Within normal limits.  RETROPERITONEUM/LYMPH NODES: No lymphadenopathy.  ABDOMINAL WALL: Inflammatory change in the lower pelvis/abdominal wall. No drainable abscess.  BONES: Within normal limits.    IMPRESSION:    Edema and cellulitis along the scrotum and base of the penis without drainable abscess collection.    Trace pericardial fluid.    New focal lingular consolidation when compared with prior exam of May 26, 2016 which is indeterminate. Consider 3 month follow-up chest CT for this finding.    < end of copied text >     Subjective  No acute events overnight. Denies fevers, chills, nausea, vomiting, SOB, CP.  Tolerating diet.    Objective    Vital signs  T(F): , Max: 100.2 (10-09-20 @ 21:22)  HR: 88 (10-10-20 @ 05:26)  BP: 139/71 (10-10-20 @ 05:26)  SpO2: 92% (10-10-20 @ 05:26)    OUT:    Bulb (mL): 70 mL    Indwelling Catheter - Urethral (mL): 1675 mL    Voided (mL): 775 mL  Total OUT: 2520 mL    Total NET: -2520 mL          Gen: NAD  Abd: obese, soft, ntnd   : + scrotal edema, incision c/d/i  flanagan in place yellow urine, JEREMIAS drain to bulb suction    Labs  10-09 @ 07:19  WBC 5.03  / Hct 26.6  / SCr 1.71         Culture - Fungal, Other (collected 10-09-20 @ 00:33)  Source: .Other Other  Preliminary Report (10-09-20 @ 08:31):    Testing in progress    Culture - Blood (collected 10-07-20 @ 22:03)  Source: .Blood Blood-Peripheral  Preliminary Report (10-08-20 @ 23:01):    No growth to date.    Culture - Blood (collected 10-07-20 @ 22:03)  Source: .Blood Blood-Peripheral  Preliminary Report (10-08-20 @ 23:01):    No growth to date.    Culture - Urine (collected 10-07-20 @ 21:06)  Source: .Urine Clean Catch (Midstream)  Preliminary Report (10-09-20 @ 03:35):    >100,000 CFU/ml Enterococcus species    10,000 - 49,000 CFU/mL Coag Negative Staphylococcus "Susceptibilities not    performed"      Urine Cx: Culture - Blood (10.07.20 @ 22:03)    Specimen Source: .Blood Blood-Peripheral    Culture Results:   No growth to date.        Culture - Urine (10.07.20 @ 21:06)    Specimen Source: .Urine Clean Catch (Midstream)    Culture Results:   >100,000 CFU/ml Enterococcus species  10,000 - 49,000 CFU/mL Coag Negative Staphylococcus "Susceptibilities not  performed"      Imaging  < from: CT Abdomen and Pelvis No Cont (10.07.20 @ 17:50) >  FINDINGS:  LOWER CHEST:  Lingular opacity measures 2.1 x 1.7 cm (4, 19). Mild emphysema. Right basilar dependent atelectasis. Small pericardial effusion. Coronary artery and aortic valve calcifications. Redemonstrated ectatic ascending aorta measures up to 4.4 cm.    LIVER: Within normal limits.  BILE DUCTS: Normal caliber.  GALLBLADDER: Cholelithiasis.  SPLEEN: Within normal limits.  PANCREAS: Within normal limits.  ADRENALS: Within normal limits.  KIDNEYS/URETERS: Nonspecific bilateral renal stranding. 1.4 cm right renal lesion measures simple fluid density, likely cyst. 1.2 cm left renal cyst (10HU).    BLADDER: Within normal limits.  REPRODUCTIVE ORGANS: Penile implant with surrounding soft tissue and scrotal cellulitis. No drainable abscess. Balloon within the left lower hemipelvis. Prostatic seeds.    BOWEL: No bowel obstruction. Colonic diverticulosis without acute diverticulitis.  PERITONEUM: No ascites.  VESSELS: Within normal limits.  RETROPERITONEUM/LYMPH NODES: No lymphadenopathy.  ABDOMINAL WALL: Inflammatory change in the lower pelvis/abdominal wall. No drainable abscess.  BONES: Within normal limits.    IMPRESSION:    Edema and cellulitis along the scrotum and base of the penis without drainable abscess collection.    Trace pericardial fluid.    New focal lingular consolidation when compared with prior exam of May 26, 2016 which is indeterminate. Consider 3 month follow-up chest CT for this finding.    < end of copied text >

## 2020-10-11 LAB
-  AMPICILLIN: SIGNIFICANT CHANGE UP
-  TETRACYCLINE: SIGNIFICANT CHANGE UP
-  VANCOMYCIN: SIGNIFICANT CHANGE UP
CULTURE RESULTS: SIGNIFICANT CHANGE UP
METHOD TYPE: SIGNIFICANT CHANGE UP
ORGANISM # SPEC MICROSCOPIC CNT: SIGNIFICANT CHANGE UP
ORGANISM # SPEC MICROSCOPIC CNT: SIGNIFICANT CHANGE UP
SPECIMEN SOURCE: SIGNIFICANT CHANGE UP

## 2020-10-12 ENCOUNTER — APPOINTMENT (OUTPATIENT)
Age: 80
End: 2020-10-12

## 2020-10-12 LAB
CULTURE RESULTS: SIGNIFICANT CHANGE UP
CULTURE RESULTS: SIGNIFICANT CHANGE UP
SPECIMEN SOURCE: SIGNIFICANT CHANGE UP
SPECIMEN SOURCE: SIGNIFICANT CHANGE UP

## 2020-10-13 ENCOUNTER — APPOINTMENT (OUTPATIENT)
Dept: UROLOGY | Facility: CLINIC | Age: 80
End: 2020-10-13
Payer: MEDICARE

## 2020-10-13 VITALS — TEMPERATURE: 97.7 F

## 2020-10-13 DIAGNOSIS — T83.591A INFECTION AND INFLAMMATORY REACTION DUE TO IMPLANTED URINARY SPHINCTER, INITIAL ENCOUNTER: ICD-10-CM

## 2020-10-13 LAB — SURGICAL PATHOLOGY STUDY: SIGNIFICANT CHANGE UP

## 2020-10-13 PROCEDURE — 99024 POSTOP FOLLOW-UP VISIT: CPT

## 2020-10-13 NOTE — ASSESSMENT
[FreeTextEntry1] : Patient is a 79 yo M who presents for obstructive urinary symptoms.  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  He was also on CIC for roughly 1 yr and is now s/p AUS insertion in 2015.\par S/p laser urethrotomy and now s/p AUS removal due to infection likely related to self cathing.\par \par Drain removed today\par Will plan for d/c flanagan next week\par Cont abx\par F/u 1 wk

## 2020-10-13 NOTE — PHYSICAL EXAM
[General Appearance - Well Developed] : well developed [General Appearance - Well Nourished] : well nourished [Normal Appearance] : normal appearance [Well Groomed] : well groomed [General Appearance - In No Acute Distress] : no acute distress [FreeTextEntry1] : diffuse penoscrotal edema, incisions c/d/i.  mild erythema and edema c/w postop.  Drain in place

## 2020-10-13 NOTE — HISTORY OF PRESENT ILLNESS
[FreeTextEntry1] : Patient is a 81 yo M who presents for prostate stricture, obstructive LUTS of difficulty voiding, weak stream, straining to empty.  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  He was also on CIC for roughly 1 yr.  He developed LALI thereafter and underwent AUS in 2015 with Dr Munoz.  Had done well until most recently.  Does still leak with certain valsalva/activity - sitting down.\par \par He is now s/p laser urethrotomy of prostate urethral stricture and injection of amniofix 8/27/20.  He had severe bladder urgency and frequency after flanagan removed pstop.  He returned to ER following day.  PVR was ~180-200cc and pt voided ~100cc, AUS deactivated and 12 Fr flanagan placed.\par Flanagan was removed but since he notes intermittent bladder spasms and severe bladder pressure to void.\par He was taught to deactivate device and was performing self cathing.  He developed scrotal swelling and noted to have infection of AUS.\par \par Now s/p AUS removal and washout.  OR cxs returned entercoccus sensitive to augmentin, urine cx from office with enterococcus and staph.  Given incontinence and treated with vanco in hospital, likely staph is contaminant.\par \par Here for f/u.  Drain outputs low - <15cc daily.  He requests to keep flanagan as he has some persistent swelling and pain and is concerned about flanagan removal.  No fever/chills.

## 2020-10-15 ENCOUNTER — APPOINTMENT (OUTPATIENT)
Age: 80
End: 2020-10-15

## 2020-10-19 ENCOUNTER — APPOINTMENT (OUTPATIENT)
Dept: FAMILY MEDICINE | Facility: CLINIC | Age: 80
End: 2020-10-19
Payer: MEDICARE

## 2020-10-19 VITALS
HEART RATE: 77 BPM | HEIGHT: 69 IN | TEMPERATURE: 98.2 F | RESPIRATION RATE: 16 BRPM | WEIGHT: 254 LBS | BODY MASS INDEX: 37.62 KG/M2 | DIASTOLIC BLOOD PRESSURE: 86 MMHG | OXYGEN SATURATION: 97 % | SYSTOLIC BLOOD PRESSURE: 150 MMHG

## 2020-10-19 DIAGNOSIS — Z23 ENCOUNTER FOR IMMUNIZATION: ICD-10-CM

## 2020-10-19 PROCEDURE — 99203 OFFICE O/P NEW LOW 30 MIN: CPT | Mod: 25

## 2020-10-19 PROCEDURE — G0008: CPT

## 2020-10-19 PROCEDURE — 90662 IIV NO PRSV INCREASED AG IM: CPT

## 2020-10-19 RX ORDER — SULFAMETHOXAZOLE AND TRIMETHOPRIM 800; 160 MG/1; MG/1
800-160 TABLET ORAL TWICE DAILY
Qty: 20 | Refills: 0 | Status: DISCONTINUED | COMMUNITY
Start: 2020-09-16 | End: 2020-10-19

## 2020-10-19 RX ORDER — AMOXICILLIN AND CLAVULANATE POTASSIUM 875; 125 MG/1; MG/1
875-125 TABLET, COATED ORAL
Qty: 14 | Refills: 0 | Status: DISCONTINUED | COMMUNITY
Start: 2020-10-13 | End: 2020-10-19

## 2020-10-19 RX ORDER — CIPROFLOXACIN HYDROCHLORIDE 500 MG/1
500 TABLET, FILM COATED ORAL TWICE DAILY
Qty: 20 | Refills: 0 | Status: DISCONTINUED | COMMUNITY
Start: 2020-09-21 | End: 2020-10-19

## 2020-10-19 RX ORDER — ALLOPURINOL 300 MG/1
300 TABLET ORAL
Qty: 45 | Refills: 2 | Status: ACTIVE | COMMUNITY
Start: 1900-01-01 | End: 1900-01-01

## 2020-10-19 NOTE — PHYSICAL EXAM
[No Acute Distress] : no acute distress [Well Nourished] : well nourished [Well Developed] : well developed [Well-Appearing] : well-appearing [Normal Sclera/Conjunctiva] : normal sclera/conjunctiva [PERRL] : pupils equal round and reactive to light [EOMI] : extraocular movements intact [Normal Outer Ear/Nose] : the outer ears and nose were normal in appearance [No JVD] : no jugular venous distention [Supple] : supple [No Lymphadenopathy] : no lymphadenopathy [Thyroid Normal, No Nodules] : the thyroid was normal and there were no nodules present [No Respiratory Distress] : no respiratory distress  [No Accessory Muscle Use] : no accessory muscle use [Clear to Auscultation] : lungs were clear to auscultation bilaterally [Normal Rate] : normal rate  [Normal S1, S2] : normal S1 and S2 [Regular Rhythm] : with a regular rhythm [No Murmur] : no murmur heard [Non Tender] : non-tender [Soft] : abdomen soft [Non-distended] : non-distended [Normal Posterior Cervical Nodes] : no posterior cervical lymphadenopathy [Normal Anterior Cervical Nodes] : no anterior cervical lymphadenopathy [No CVA Tenderness] : no CVA  tenderness [No Joint Swelling] : no joint swelling [No Spinal Tenderness] : no spinal tenderness [Grossly Normal Strength/Tone] : grossly normal strength/tone [No Rash] : no rash [Coordination Grossly Intact] : coordination grossly intact [No Focal Deficits] : no focal deficits [Normal Gait] : normal gait [Normal Affect] : the affect was normal [Normal Insight/Judgement] : insight and judgment were intact [Normal Oropharynx] : the oropharynx was normal [Normal TMs] : both tympanic membranes were normal

## 2020-10-19 NOTE — HISTORY OF PRESENT ILLNESS
[FreeTextEntry8] : DEANDRA is here to establish care \par DEANDRA would like to get a flu vaccine \par DEANDRA states he feels in good health. \par [facility]

## 2020-10-19 NOTE — PLAN
[FreeTextEntry1] : Flu given. Rx renewed Allopurinol and Rosuvastatin\par Flu given\par Will obtain blood work from recent hospital visit.

## 2020-10-19 NOTE — HEALTH RISK ASSESSMENT
[Yes] : Yes [4 or more  times a week (4 pts)] : 4 or more  times a week (4 points) [1 or 2 (0 pts)] : 1 or 2 (0 points) [Less than monthly (1 pt)] : Less than monthly (1 point) [No] : In the past 12 months have you used drugs other than those required for medical reasons? No [No falls in past year] : Patient reported no falls in the past year [0] : 2) Feeling down, depressed, or hopeless: Not at all (0) [Audit-CScore] : 5 [] : No [[id number]] : 0

## 2020-10-20 ENCOUNTER — APPOINTMENT (OUTPATIENT)
Dept: UROLOGY | Facility: CLINIC | Age: 80
End: 2020-10-20
Payer: MEDICARE

## 2020-10-20 VITALS — TEMPERATURE: 97.9 F

## 2020-10-20 DIAGNOSIS — T83.9XXA UNSPECIFIED COMPLICATION OF GENITOURINARY PROSTHETIC DEVICE, IMPLANT AND GRAFT, INITIAL ENCOUNTER: ICD-10-CM

## 2020-10-20 PROCEDURE — 99024 POSTOP FOLLOW-UP VISIT: CPT

## 2020-10-20 NOTE — ASSESSMENT
[FreeTextEntry1] : Patient is a 81 yo M who presents for obstructive urinary symptoms.  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  He was also on CIC for roughly 1 yr and is now s/p AUS insertion in 2015.\par S/p laser urethrotomy and now s/p AUS removal due to infection likely related to self cathing.\par Higgins removed today\par He will resume CIC nightly\par Finish abx\par F/u 3 mos\par

## 2020-10-20 NOTE — PHYSICAL EXAM
[General Appearance - Well Developed] : well developed [General Appearance - Well Nourished] : well nourished [Normal Appearance] : normal appearance [Well Groomed] : well groomed [General Appearance - In No Acute Distress] : no acute distress [Urinary Bladder Findings] : the bladder was normal on palpation [Scrotum] : the scrotum was normal [FreeTextEntry1] : well healing incisions, flanagan with clear yellow urine - removed with ease

## 2020-10-20 NOTE — HISTORY OF PRESENT ILLNESS
[FreeTextEntry1] : Patient is a 79 yo M who presents for prostate stricture, obstructive LUTS of difficulty voiding, weak stream, straining to empty.  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  He was also on CIC for roughly 1 yr.  He developed LALI thereafter and underwent AUS in 2015 with Dr Munoz.  Had done well until most recently.  Does still leak with certain valsalva/activity - sitting down.\par \par He is now s/p laser urethrotomy of prostate urethral stricture and injection of amniofix 8/27/20.  He had severe bladder urgency and frequency after flanagan removed pstop.  He returned to ER following day.  PVR was ~180-200cc and pt voided ~100cc, AUS deactivated and 12 Fr flanagan placed.\par Flanagan was removed but since he notes intermittent bladder spasms and severe bladder pressure to void.\par He was taught to deactivate device and was performing self cathing.  He developed scrotal swelling and noted to have infection of AUS.\par \par Now s/p AUS removal and washout.  OR cxs returned entercoccus sensitive to augmentin, urine cx from office with enterococcus and staph.  Given incontinence and treated with vanco in hospital, likely staph is contaminant.\par \par Here for f/u.  Completing abx.  Feels well.  Swelling has significantly improved.  No fever/chills or pain.

## 2020-10-21 ENCOUNTER — APPOINTMENT (OUTPATIENT)
Dept: UROLOGY | Facility: HOSPITAL | Age: 80
End: 2020-10-21

## 2020-10-21 PROCEDURE — 85610 PROTHROMBIN TIME: CPT

## 2020-10-21 PROCEDURE — 85730 THROMBOPLASTIN TIME PARTIAL: CPT

## 2020-10-21 PROCEDURE — 81001 URINALYSIS AUTO W/SCOPE: CPT

## 2020-10-21 PROCEDURE — 85027 COMPLETE CBC AUTOMATED: CPT

## 2020-10-21 PROCEDURE — 85018 HEMOGLOBIN: CPT

## 2020-10-21 PROCEDURE — 83605 ASSAY OF LACTIC ACID: CPT

## 2020-10-21 PROCEDURE — 84132 ASSAY OF SERUM POTASSIUM: CPT

## 2020-10-21 PROCEDURE — 93005 ELECTROCARDIOGRAM TRACING: CPT

## 2020-10-21 PROCEDURE — 76000 FLUOROSCOPY <1 HR PHYS/QHP: CPT

## 2020-10-21 PROCEDURE — 87186 SC STD MICRODIL/AGAR DIL: CPT

## 2020-10-21 PROCEDURE — 96374 THER/PROPH/DIAG INJ IV PUSH: CPT

## 2020-10-21 PROCEDURE — 84295 ASSAY OF SERUM SODIUM: CPT

## 2020-10-21 PROCEDURE — 86850 RBC ANTIBODY SCREEN: CPT

## 2020-10-21 PROCEDURE — 80053 COMPREHEN METABOLIC PANEL: CPT

## 2020-10-21 PROCEDURE — 87102 FUNGUS ISOLATION CULTURE: CPT

## 2020-10-21 PROCEDURE — 88300 SURGICAL PATH GROSS: CPT

## 2020-10-21 PROCEDURE — U0003: CPT

## 2020-10-21 PROCEDURE — 99285 EMERGENCY DEPT VISIT HI MDM: CPT | Mod: 25

## 2020-10-21 PROCEDURE — 82330 ASSAY OF CALCIUM: CPT

## 2020-10-21 PROCEDURE — 85025 COMPLETE CBC W/AUTO DIFF WBC: CPT

## 2020-10-21 PROCEDURE — 86900 BLOOD TYPING SEROLOGIC ABO: CPT

## 2020-10-21 PROCEDURE — 87086 URINE CULTURE/COLONY COUNT: CPT

## 2020-10-21 PROCEDURE — 84145 PROCALCITONIN (PCT): CPT

## 2020-10-21 PROCEDURE — 82962 GLUCOSE BLOOD TEST: CPT

## 2020-10-21 PROCEDURE — 83036 HEMOGLOBIN GLYCOSYLATED A1C: CPT

## 2020-10-21 PROCEDURE — 82435 ASSAY OF BLOOD CHLORIDE: CPT

## 2020-10-21 PROCEDURE — 86901 BLOOD TYPING SEROLOGIC RH(D): CPT

## 2020-10-21 PROCEDURE — 74176 CT ABD & PELVIS W/O CONTRAST: CPT

## 2020-10-21 PROCEDURE — 85014 HEMATOCRIT: CPT

## 2020-10-21 PROCEDURE — C1769: CPT

## 2020-10-21 PROCEDURE — 87040 BLOOD CULTURE FOR BACTERIA: CPT

## 2020-10-21 PROCEDURE — 82947 ASSAY GLUCOSE BLOOD QUANT: CPT

## 2020-10-21 PROCEDURE — 82803 BLOOD GASES ANY COMBINATION: CPT

## 2020-10-21 PROCEDURE — 86769 SARS-COV-2 COVID-19 ANTIBODY: CPT

## 2020-10-21 PROCEDURE — 87070 CULTURE OTHR SPECIMN AEROBIC: CPT

## 2020-10-21 PROCEDURE — C9399: CPT

## 2020-10-21 PROCEDURE — 80048 BASIC METABOLIC PNL TOTAL CA: CPT

## 2020-10-26 ENCOUNTER — APPOINTMENT (OUTPATIENT)
Dept: FAMILY MEDICINE | Facility: CLINIC | Age: 80
End: 2020-10-26
Payer: MEDICARE

## 2020-10-26 VITALS
HEART RATE: 75 BPM | HEIGHT: 69 IN | RESPIRATION RATE: 16 BRPM | SYSTOLIC BLOOD PRESSURE: 160 MMHG | WEIGHT: 255 LBS | TEMPERATURE: 97.9 F | OXYGEN SATURATION: 98 % | DIASTOLIC BLOOD PRESSURE: 90 MMHG | BODY MASS INDEX: 37.77 KG/M2

## 2020-10-26 DIAGNOSIS — Z23 ENCOUNTER FOR IMMUNIZATION: ICD-10-CM

## 2020-10-26 PROCEDURE — 90732 PPSV23 VACC 2 YRS+ SUBQ/IM: CPT

## 2020-10-26 PROCEDURE — 99214 OFFICE O/P EST MOD 30 MIN: CPT | Mod: 25

## 2020-10-26 PROCEDURE — G0009: CPT

## 2020-10-26 NOTE — HISTORY OF PRESENT ILLNESS
[FreeTextEntry1] : DEANDRA is here for a pneumonia vaccine  [de-identified] : DEANDRA states he otherwise feels in good health.

## 2020-11-05 ENCOUNTER — NON-APPOINTMENT (OUTPATIENT)
Age: 80
End: 2020-11-05

## 2020-11-06 ENCOUNTER — APPOINTMENT (OUTPATIENT)
Dept: UROLOGY | Facility: CLINIC | Age: 80
End: 2020-11-06

## 2020-11-06 LAB
HBA1C MFR BLD HPLC: 7.3
LDLC SERPL DIRECT ASSAY-MCNC: 93

## 2020-11-07 LAB
CULTURE RESULTS: SIGNIFICANT CHANGE UP
SPECIMEN SOURCE: SIGNIFICANT CHANGE UP

## 2020-11-09 ENCOUNTER — APPOINTMENT (OUTPATIENT)
Dept: ENDOCRINOLOGY | Facility: CLINIC | Age: 80
End: 2020-11-09
Payer: MEDICARE

## 2020-11-09 VITALS
BODY MASS INDEX: 37.62 KG/M2 | HEIGHT: 69 IN | SYSTOLIC BLOOD PRESSURE: 150 MMHG | DIASTOLIC BLOOD PRESSURE: 76 MMHG | OXYGEN SATURATION: 95 % | HEART RATE: 67 BPM | WEIGHT: 254 LBS

## 2020-11-09 LAB — GLUCOSE BLDC GLUCOMTR-MCNC: 156

## 2020-11-09 PROCEDURE — 99214 OFFICE O/P EST MOD 30 MIN: CPT | Mod: 25

## 2020-11-09 PROCEDURE — 82962 GLUCOSE BLOOD TEST: CPT

## 2020-11-09 RX ORDER — OXYCODONE AND ACETAMINOPHEN 5; 325 MG/1; MG/1
5-325 TABLET ORAL
Qty: 16 | Refills: 0 | Status: DISCONTINUED | COMMUNITY
Start: 2020-10-09

## 2020-11-09 RX ORDER — CIPROFLOXACIN HYDROCHLORIDE 250 MG/1
250 TABLET, FILM COATED ORAL
Qty: 20 | Refills: 0 | Status: DISCONTINUED | COMMUNITY
Start: 2020-08-06

## 2020-11-09 RX ORDER — CEFUROXIME AXETIL 250 MG/1
250 TABLET ORAL
Qty: 14 | Refills: 0 | Status: DISCONTINUED | COMMUNITY
Start: 2020-08-29

## 2020-11-09 RX ORDER — CEPHALEXIN 500 MG/1
500 CAPSULE ORAL
Qty: 2 | Refills: 0 | Status: DISCONTINUED | COMMUNITY
Start: 2020-08-27

## 2020-11-09 NOTE — ASSESSMENT
[FreeTextEntry1] : 79 y/o morbid obese male Type 2 DM, Hyperlipidemia and Vitamin D deficiency. \par \par Plan: \par Type 2 DM: controlled \par - continue current Rx \par - increase self BG monitoring to 1-2 times a day \par - send in logs in 1 week\par - bring meter to next visit\par - continue to follow up with ophthalmology due to vision changes \par - repeat A1C before next visit \par \par Hyperlipidemia: controlled, continue Rosuvastatin\par - repeat lipids before next visit \par \par Morbid obesity:encouraged routine exercise\par -  educated on healthy food choices - increase water intake to help kidney function \par \par Follow up with Hematology due to chronic anemia \par \par Vitamin D deficiency: resolved \par \par Follow up with Nephrology r/t elevated Creatinine, Microalbuminuria, low GFR and elevated BUN. Pt. has appointment tomorrow with Nephrology. \par  \par Follow up visit in 4-6 months after returning from [place].

## 2020-11-09 NOTE — PHYSICAL EXAM
[Alert] : alert [Well Nourished] : well nourished [No Acute Distress] : no acute distress [Well Developed] : well developed [Normal Sclera/Conjunctiva] : normal sclera/conjunctiva [EOMI] : extra ocular movement intact [No LAD] : no lymphadenopathy [Thyroid Not Enlarged] : the thyroid was not enlarged [No Thyroid Nodules] : no palpable thyroid nodules [Normal S1, S2] : normal S1 and S2 [Normal Rate] : heart rate was normal [Regular Rhythm] : with a regular rhythm [No Edema] : no peripheral edema [Pedal Pulses Normal] : the pedal pulses are present [Normal Bowel Sounds] : normal bowel sounds [Not Tender] : non-tender [Soft] : abdomen soft [Normal Gait] : normal gait [No Rash] : no rash [No Tremors] : no tremors [Oriented x3] : oriented to person, place, and time [Normal Insight/Judgement] : insight and judgment were intact [Normal Mood] : the mood was normal [Acanthosis Nigricans] : no acanthosis nigricans [de-identified] : obese

## 2020-11-09 NOTE — HISTORY OF PRESENT ILLNESS
[FreeTextEntry1] : In August and October 2020 had urethra stretcher and ( AUS) artificial urethra sphincter. After procedure having increasing urination during the day and leaks out. Pt. has a follow up appointment in about a month. He may need a permanent catheter.\par \par Quality: Type 2 DM\par Severity: moderate\par Duration: 1998\par Onset: routine labs\par Associated Symptoms: Nephropathy\par \par Current Regimen: \par Levemir 26 units at HS\par Glimepiride 4 mg BID \par - stopped Repaglinide 1 mg before meals \par hx of Metformin use but no longer r/t CKD3\par \par self BG monitoring not often, a couple times a week, no meter, no logs\par per pt. average blood sugars 140s-150s \par no hypoglycemic symptoms\par Current \par \par exercise: none\par \par Diet: watch carb intake \par Weight stable \par \par Date of last eye exam: 5/2020 (-) diabetic retinopathy, every 6 months  \par Date of last foot exam: in office only\par Date of last flu vaccine: 2020\par Date of Pneumovax: refused

## 2020-11-09 NOTE — REVIEW OF SYSTEMS
[Polyuria] : polyuria [Nocturia] : nocturia [Incontinence] : incontinence [Depression] : depression [Fatigue] : no fatigue [Decreased Appetite] : appetite not decreased [Recent Weight Gain (___ Lbs)] : no recent weight gain [Recent Weight Loss (___ Lbs)] : no recent weight loss [Visual Field Defect] : no visual field defect [Blurred Vision] : no blurred vision [Dysphagia] : no dysphagia [Neck Pain] : no neck pain [Dysphonia] : no dysphonia [Chest Pain] : no chest pain [Palpitations] : no palpitations [Constipation] : no constipation [Diarrhea] : no diarrhea [Dysuria] : no dysuria [Headaches] : no headaches [Tremors] : no tremors [Suicidal Ideation] : no suicidal ideation [Anxiety] : no anxiety [Polydipsia] : no polydipsia [Swelling] : no swelling [FreeTextEntry2] : weight stable  [de-identified] : per pt. "little depression"

## 2020-12-22 ENCOUNTER — APPOINTMENT (OUTPATIENT)
Dept: UROLOGY | Facility: CLINIC | Age: 80
End: 2020-12-22
Payer: MEDICARE

## 2020-12-22 VITALS — TEMPERATURE: 97.8 F

## 2020-12-22 PROCEDURE — 99024 POSTOP FOLLOW-UP VISIT: CPT

## 2020-12-22 NOTE — ASSESSMENT
[FreeTextEntry1] : Patient is a 79 yo M who presents for prostate stricture, severe urinary incontinence.\par \par -d/w pt condom cath, cunningham clamp, indwelling flanagan as options for his incontinence.  He has penile clamp that he has tried at home which falls off\par -he does not want to try flanagan as he leaked around flanagan, and not interested in condom cath as his penis is retracted\par - provided pt with sample condom caths and teaching to use\par -also provided pt with rx for external urethral cuff device -- he will purchase and try\par -he is adamant he cannot continue like this - d/w pt that I am very reluctant to replace AUS device given his prostate stricture and infection that developed after endoscopic procedure for his prostate stricture\par -f/u cysto - d/w pt that if cystoscopy shows relatively patent urethra/prostate would consider AUS, but he is at very high risk of complications and further erosion.  In meantime he will try condom cath and external urethral cuff\par

## 2020-12-22 NOTE — HISTORY OF PRESENT ILLNESS
[FreeTextEntry1] : Patient is a 79 yo M who presents for prostate stricture, obstructive LUTS of difficulty voiding, weak stream, straining to empty.  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  He was also on CIC for roughly 1 yr.  He developed LALI thereafter and underwent AUS in 2015 with Dr Munoz.  Had done well until most recently.  Does still leak with certain valsalva/activity - sitting down.\par \par He is now s/p laser urethrotomy of prostate urethral stricture and injection of amniofix 8/27/20.  He had severe bladder urgency and frequency after flanagan removed pstop.  He returned to ER following day.  PVR was ~180-200cc and pt voided ~100cc, AUS deactivated and 12 Fr flanagan placed.\par Flanagan was removed but since he notes intermittent bladder spasms and severe bladder pressure to void.\par He was taught to deactivate device and was performing self cathing.  He developed scrotal swelling and noted to have infection of AUS.\par \par Now s/p AUS removal and washout.  OR cxs returned entercoccus sensitive to augmentin, urine cx from office with enterococcus and staph.  Given incontinence and treated with vanco in hospital, likely staph is contaminant.\par \par Here for f/u.  He has been leaking since AUS removal.  No fever/chills.  He is able to create a stream at nighttime by holding his penis.  But essentially he is completely incontinent.  He states his QOL is terrible and miserable.  He does not want to continue to live like this.  He would like AUS replaced.  He feels he has a good stream when he is able to hold his penis, better than prior streams.\par

## 2021-01-06 ENCOUNTER — RX RENEWAL (OUTPATIENT)
Age: 81
End: 2021-01-06

## 2021-01-15 ENCOUNTER — OUTPATIENT (OUTPATIENT)
Dept: OUTPATIENT SERVICES | Facility: HOSPITAL | Age: 81
LOS: 1 days | End: 2021-01-15
Payer: MEDICARE

## 2021-01-15 ENCOUNTER — APPOINTMENT (OUTPATIENT)
Dept: UROLOGY | Facility: CLINIC | Age: 81
End: 2021-01-15
Payer: MEDICARE

## 2021-01-15 VITALS — DIASTOLIC BLOOD PRESSURE: 66 MMHG | HEART RATE: 64 BPM | SYSTOLIC BLOOD PRESSURE: 146 MMHG

## 2021-01-15 DIAGNOSIS — R35.0 FREQUENCY OF MICTURITION: ICD-10-CM

## 2021-01-15 DIAGNOSIS — G56.00 CARPAL TUNNEL SYNDROME, UNSPECIFIED UPPER LIMB: Chronic | ICD-10-CM

## 2021-01-15 DIAGNOSIS — Z87.448 PERSONAL HISTORY OF OTHER DISEASES OF URINARY SYSTEM: Chronic | ICD-10-CM

## 2021-01-15 PROCEDURE — 51702 INSERT TEMP BLADDER CATH: CPT

## 2021-01-19 NOTE — PRE-ANESTHESIA EVALUATION ADULT - NSANTHASARD_GEN_ALL_CORE
Spoke with patient's  Corbin via phone for follow up anticoagulation visit.    Last INR on 1/5/21 was 3.0.  Dose maintained.   Today's INR is 2.4 and is within goal range.    Current warfarin total weekly dose of 15 mg verified.  Informed the INR result is within therapeutic range and instructed to maintain current dose per protocol. Discussed dose and return date of 2/2/21 for next INR. See Anticoagulation flowsheet.    Dr. Blair is in the office today supervising the treatment. Note forwarded for review.    Instructed to contact the clinic with any unusual bleeding or bruising, any changes in medications, diet, health status, lifestyle, or any other changes, questions or concerns. Verbalized understanding of all discussed.        3

## 2021-01-20 DIAGNOSIS — R32 UNSPECIFIED URINARY INCONTINENCE: ICD-10-CM

## 2021-01-20 DIAGNOSIS — N42.89 OTHER SPECIFIED DISORDERS OF PROSTATE: ICD-10-CM

## 2021-01-28 ENCOUNTER — APPOINTMENT (OUTPATIENT)
Dept: FAMILY MEDICINE | Facility: CLINIC | Age: 81
End: 2021-01-28
Payer: MEDICARE

## 2021-01-28 PROCEDURE — 99442: CPT | Mod: 95

## 2021-01-28 RX ORDER — ROSUVASTATIN CALCIUM 10 MG/1
10 TABLET, FILM COATED ORAL
Qty: 90 | Refills: 1 | Status: ACTIVE | COMMUNITY
Start: 1900-01-01 | End: 1900-01-01

## 2021-01-29 ENCOUNTER — APPOINTMENT (OUTPATIENT)
Dept: UROLOGY | Facility: CLINIC | Age: 81
End: 2021-01-29

## 2021-02-16 ENCOUNTER — OUTPATIENT (OUTPATIENT)
Dept: OUTPATIENT SERVICES | Facility: HOSPITAL | Age: 81
LOS: 1 days | End: 2021-02-16
Payer: MEDICARE

## 2021-02-16 ENCOUNTER — APPOINTMENT (OUTPATIENT)
Dept: UROLOGY | Facility: CLINIC | Age: 81
End: 2021-02-16
Payer: MEDICARE

## 2021-02-16 VITALS — SYSTOLIC BLOOD PRESSURE: 147 MMHG | HEART RATE: 67 BPM | DIASTOLIC BLOOD PRESSURE: 77 MMHG

## 2021-02-16 DIAGNOSIS — G56.00 CARPAL TUNNEL SYNDROME, UNSPECIFIED UPPER LIMB: Chronic | ICD-10-CM

## 2021-02-16 DIAGNOSIS — Z87.448 PERSONAL HISTORY OF OTHER DISEASES OF URINARY SYSTEM: Chronic | ICD-10-CM

## 2021-02-16 DIAGNOSIS — R35.0 FREQUENCY OF MICTURITION: ICD-10-CM

## 2021-02-16 PROCEDURE — 51702 INSERT TEMP BLADDER CATH: CPT

## 2021-02-22 DIAGNOSIS — N42.89 OTHER SPECIFIED DISORDERS OF PROSTATE: ICD-10-CM

## 2021-02-22 DIAGNOSIS — R32 UNSPECIFIED URINARY INCONTINENCE: ICD-10-CM

## 2021-02-23 NOTE — REVIEW OF SYSTEMS
Lab Results   Component Value Date    EGFR 58 02/23/2021    EGFR 60 02/22/2021    EGFR 55 02/21/2021    CREATININE 1 27 02/23/2021    CREATININE 1 23 02/22/2021    CREATININE 1 32 (H) 02/21/2021   · Per notes, patient has chronic kidney disease stage III  · Unclear baseline, labs not available  · Serum creatinine improved with IV fluids  · As per nephrologist, if serum creatinine remains 1 3 to 1 6, okay to undergo lower extremity angiogram; to start IV fluids again, tomorrow 2/23 at 8:00 a m  · Continue to hold off on the lisinopril for now  · Clear for angiogram per Nephrology continue IV fluid  [Feeling Tired] : not feeling tired [Feeling Poorly] : not feeling poorly [Abdominal Pain] : no abdominal pain [Dysuria] : no dysuria

## 2021-03-09 ENCOUNTER — APPOINTMENT (OUTPATIENT)
Dept: NEUROSURGERY | Facility: CLINIC | Age: 81
End: 2021-03-09
Payer: MEDICARE

## 2021-03-09 VITALS
HEART RATE: 86 BPM | DIASTOLIC BLOOD PRESSURE: 75 MMHG | WEIGHT: 250 LBS | BODY MASS INDEX: 36.2 KG/M2 | HEIGHT: 69.5 IN | SYSTOLIC BLOOD PRESSURE: 144 MMHG | TEMPERATURE: 98.5 F

## 2021-03-09 DIAGNOSIS — Z87.891 PERSONAL HISTORY OF NICOTINE DEPENDENCE: ICD-10-CM

## 2021-03-09 DIAGNOSIS — M25.551 PAIN IN RIGHT HIP: ICD-10-CM

## 2021-03-09 DIAGNOSIS — M54.9 DORSALGIA, UNSPECIFIED: ICD-10-CM

## 2021-03-09 PROCEDURE — 99204 OFFICE O/P NEW MOD 45 MIN: CPT

## 2021-03-09 NOTE — PHYSICAL EXAM
[General Appearance - Alert] : alert [General Appearance - In No Acute Distress] : in no acute distress [General Appearance - Well Nourished] : well nourished [General Appearance - Well Developed] : well developed [General Appearance - Well-Appearing] : healthy appearing [] : normal voice and communication [Oriented To Time, Place, And Person] : oriented to person, place, and time [Impaired Insight] : insight and judgment were intact [Affect] : the affect was normal [Mood] : the mood was normal [Memory Recent] : recent memory was not impaired [Memory Remote] : remote memory was not impaired [Person] : oriented to person [Place] : oriented to place [Time] : oriented to time [Motor Tone] : muscle tone was normal in all four extremities [Motor Strength] : muscle strength was normal in all four extremities [Involuntary Movements] : no involuntary movements were seen [No Muscle Atrophy] : normal bulk in all four extremities [5] : S1 ankle flexors 5/5 [FreeTextEntry6] : Right hip tenderness to palpation in the region of the greater trochanteric region. Mild pain with cross leg position [FreeTextEntry1] : Patient ambulating with a limp [FreeTextEntry8] : Patient ambulating unassisted and with a limp

## 2021-03-09 NOTE — ASSESSMENT
[FreeTextEntry1] : Discussed the history, physical examination findings, and recent imaging with the patient with all questions answered.  After review of the pelvis and lumbar spine radiographs along with the clinical examination, the patient may have a 2 part issue.  Symptoms may be due to lumbar spine pathology.  An MRI has been ordered of the lumbar spine to evaluate.  The patient may also have findings consistent with a right hip trochanteric bursitis.  After review of the lumbar spine MRI, the patient may be referred back to orthopedics for the MRI of the right hip.  Recommend rest modified activity and anti-inflammatory medications as tolerated until the next office visit.

## 2021-03-09 NOTE — REASON FOR VISIT
[New Patient Visit] : a new patient visit [Referred By: _________] : Patient was referred by NALINI [FreeTextEntry1] : Lower back pain into leg, numbness in leg

## 2021-03-09 NOTE — HISTORY OF PRESENT ILLNESS
[< 3 months] : less than 3 months [de-identified] : 80-year-old male presents to the neurosurgery office for an initial office visit for evaluation of back and right hip pain.  No injury or recent trauma reported.  The patient reports a procedure for correcting a urinary blockage in October 2020.  Symptoms were noticed more frequently in January 2021 when the patient initially went to the office of Dr. Mckenzie who referred the patient to our office after review of pelvis radiographs.  The patient describes right-sided back and hip pain with radiating pain down the back of the leg into the knee.  No radiation of symptoms past the knee.  The patient is complaining of intermittent 9 out of 10 pain and reports that his symptoms are aggravated with standing.  Sitting relieves the patient's symptoms.  At this time, he is only taking Tylenol at home for these complaints.

## 2021-03-16 ENCOUNTER — APPOINTMENT (OUTPATIENT)
Dept: UROLOGY | Facility: CLINIC | Age: 81
End: 2021-03-16
Payer: MEDICARE

## 2021-03-16 ENCOUNTER — OUTPATIENT (OUTPATIENT)
Dept: OUTPATIENT SERVICES | Facility: HOSPITAL | Age: 81
LOS: 1 days | End: 2021-03-16
Payer: MEDICARE

## 2021-03-16 VITALS — DIASTOLIC BLOOD PRESSURE: 92 MMHG | HEART RATE: 75 BPM | SYSTOLIC BLOOD PRESSURE: 174 MMHG

## 2021-03-16 DIAGNOSIS — G56.00 CARPAL TUNNEL SYNDROME, UNSPECIFIED UPPER LIMB: Chronic | ICD-10-CM

## 2021-03-16 DIAGNOSIS — R35.0 FREQUENCY OF MICTURITION: ICD-10-CM

## 2021-03-16 DIAGNOSIS — Z87.448 PERSONAL HISTORY OF OTHER DISEASES OF URINARY SYSTEM: Chronic | ICD-10-CM

## 2021-03-16 PROCEDURE — 51702 INSERT TEMP BLADDER CATH: CPT

## 2021-03-17 DIAGNOSIS — N42.89 OTHER SPECIFIED DISORDERS OF PROSTATE: ICD-10-CM

## 2021-03-18 ENCOUNTER — APPOINTMENT (OUTPATIENT)
Dept: NEUROSURGERY | Facility: CLINIC | Age: 81
End: 2021-03-18
Payer: MEDICARE

## 2021-03-18 VITALS
BODY MASS INDEX: 36.2 KG/M2 | DIASTOLIC BLOOD PRESSURE: 71 MMHG | SYSTOLIC BLOOD PRESSURE: 144 MMHG | HEIGHT: 69.5 IN | TEMPERATURE: 97.6 F | WEIGHT: 250 LBS | HEART RATE: 80 BPM

## 2021-03-18 DIAGNOSIS — M54.31 SCIATICA, RIGHT SIDE: ICD-10-CM

## 2021-03-18 DIAGNOSIS — M43.10 SPONDYLOLISTHESIS, SITE UNSPECIFIED: ICD-10-CM

## 2021-03-18 DIAGNOSIS — M48.062 SPINAL STENOSIS, LUMBAR REGION WITH NEUROGENIC CLAUDICATION: ICD-10-CM

## 2021-03-18 PROCEDURE — 99213 OFFICE O/P EST LOW 20 MIN: CPT

## 2021-03-18 RX ORDER — SODIUM POLYSTYRENE SULFONATE 4.1 MEQ/G
POWDER, FOR SUSPENSION ORAL; RECTAL
Qty: 454 | Refills: 0 | Status: ACTIVE | COMMUNITY
Start: 2021-01-11

## 2021-03-18 RX ORDER — CHLORTHALIDONE 25 MG/1
25 TABLET ORAL
Qty: 90 | Refills: 0 | Status: ACTIVE | COMMUNITY
Start: 2021-02-12

## 2021-03-18 NOTE — RESULTS/DATA
[FreeTextEntry1] : \par Standard MRI of the lumbar spine shows spondylolisthesis with grade 1 level stenosis at L4-5

## 2021-03-18 NOTE — REASON FOR VISIT
[Follow-Up: _____] : a [unfilled] follow-up visit [FreeTextEntry1] : I saw Andrea in the office today for a followup evaluation with new imaging of the lumbar spine. He has new imaging done at [facility] for my review.  He has some back pain and radicular complaints with also some degree of neurogenic claudication. He has a rather complex history of prostate cancer and multiple urological procedures that do coincide with this condition. Reference to his back he thinks maybe some of the symptoms and issues that he had worked related to being in the lithotomy position for prolonged period of time for one of his prostate procedures.\par \par \par multiple  procedures and flanagan catheter due to prostate\par \par \par PCP  Venkat\par [facility]\par   [place]\par

## 2021-03-19 ENCOUNTER — RX RENEWAL (OUTPATIENT)
Age: 81
End: 2021-03-19

## 2021-04-07 LAB
HBA1C MFR BLD HPLC: 8.1
LDLC SERPL DIRECT ASSAY-MCNC: 52
MICROALBUMIN/CREAT 24H UR-RTO: 957

## 2021-04-08 ENCOUNTER — APPOINTMENT (OUTPATIENT)
Dept: ENDOCRINOLOGY | Facility: CLINIC | Age: 81
End: 2021-04-08
Payer: MEDICARE

## 2021-04-08 VITALS
OXYGEN SATURATION: 92 % | WEIGHT: 259 LBS | RESPIRATION RATE: 14 BRPM | HEART RATE: 74 BPM | HEIGHT: 69.5 IN | SYSTOLIC BLOOD PRESSURE: 140 MMHG | DIASTOLIC BLOOD PRESSURE: 70 MMHG | BODY MASS INDEX: 37.5 KG/M2

## 2021-04-08 LAB — GLUCOSE BLDC GLUCOMTR-MCNC: 190

## 2021-04-08 PROCEDURE — 82962 GLUCOSE BLOOD TEST: CPT

## 2021-04-08 PROCEDURE — 99214 OFFICE O/P EST MOD 30 MIN: CPT | Mod: 25

## 2021-04-08 NOTE — HISTORY OF PRESENT ILLNESS
[FreeTextEntry1] : In August and October 2020 had urethra stretcher and ( AUS) artificial urethra sphincter.\par \par Interval History: Back pain started 1/16/21 - MRI of spine done showed spinal stenosis and static nerve, currently taking Celebrex\par Since 10/2020 has been having urinary continence then had Higgins Catheter placed 1/15 \par \par Quality: Type 2 DM\par Severity: moderate\par Duration: 1998\par Onset: routine labs\par Associated Symptoms: Nephropathy\par \par Current Regimen: \par Levemir 26 units at HS\par Glimepiride 4 mg BID \par - stopped Repaglinide 1 mg before meals \par hx of Metformin use but no longer r/t CKD3\par \par self BG monitoring not often, a couple times a week, no meter, no logs\par per pt. average blood sugars 120s\par no hypoglycemic symptoms\par Current \par \par exercise: none\par \par Diet: not watching \par Weight gained 9 pounds \par \par Date of last eye exam: 12/2020 (-) diabetic retinopathy, every 6 months  \par Date of last foot exam: in office only\par Date of last flu vaccine: 2020\par Date of Pneumovax: refused

## 2021-04-08 NOTE — PHYSICAL EXAM
[Alert] : alert [Well Nourished] : well nourished [No Acute Distress] : no acute distress [Well Developed] : well developed [Normal Sclera/Conjunctiva] : normal sclera/conjunctiva [EOMI] : extra ocular movement intact [No LAD] : no lymphadenopathy [Thyroid Not Enlarged] : the thyroid was not enlarged [No Thyroid Nodules] : no palpable thyroid nodules [Normal S1, S2] : normal S1 and S2 [Normal Rate] : heart rate was normal [Regular Rhythm] : with a regular rhythm [No Edema] : no peripheral edema [Pedal Pulses Normal] : the pedal pulses are present [Normal Bowel Sounds] : normal bowel sounds [Not Tender] : non-tender [Soft] : abdomen soft [Normal Gait] : normal gait [No Rash] : no rash [No Tremors] : no tremors [Oriented x3] : oriented to person, place, and time [Normal Insight/Judgement] : insight and judgment were intact [Normal Mood] : the mood was normal [Acanthosis Nigricans] : no acanthosis nigricans [de-identified] : obese

## 2021-04-08 NOTE — ASSESSMENT
[Importance of Diet and Exercise] : importance of diet and exercise to improve glycemic control, achieve weight loss and improve cardiovascular health [FreeTextEntry1] : 81 y/o morbid obese male Type 2 DM, Hyperlipidemia and Vitamin D deficiency. \par \par Plan: \par Type 2 DM: A1C worse due poor diet choices and back pain \par - modify diet \par - continue current Rx \par - increase self BG monitoring to 1-2 times a day \par - send in logs in 1 week\par - bring meter to next visit\par - continue to follow up with ophthalmology due to vision changes \par - repeat A1C before next visit \par \par Hyperlipidemia: controlled, continue Rosuvastatin\par - repeat lipids before next visit \par \par Morbid obesity:encouraged routine exercise\par -  educated on healthy food choices - increase water intake to help kidney function \par \par Follow up with Hematology due to chronic anemia \par \par Vitamin D deficiency: resolved \par \par Follow up with Nephrology r/t elevated Creatinine, Microalbuminuria, low GFR and elevated BUN. Pt. has appointment tomorrow with Nephrology. \par  \par Follow up visit in 3 months.

## 2021-04-08 NOTE — REVIEW OF SYSTEMS
[Recent Weight Gain (___ Lbs)] : recent weight gain: [unfilled] lbs [Depression] : depression [Anxiety] : anxiety [Fatigue] : no fatigue [Decreased Appetite] : appetite not decreased [Visual Field Defect] : no visual field defect [Blurred Vision] : no blurred vision [Dysphagia] : no dysphagia [Neck Pain] : no neck pain [Dysphonia] : no dysphonia [Chest Pain] : no chest pain [Palpitations] : no palpitations [Constipation] : no constipation [Diarrhea] : no diarrhea [Polyuria] : no polyuria [Dysuria] : no dysuria [Headaches] : no headaches [Tremors] : no tremors [Polydipsia] : no polydipsia [Swelling] : no swelling [FreeTextEntry8] : Higgins Catheter currently in place  [de-identified] : due to current situation due to back pain

## 2021-04-16 ENCOUNTER — APPOINTMENT (OUTPATIENT)
Dept: UROLOGY | Facility: CLINIC | Age: 81
End: 2021-04-16
Payer: MEDICARE

## 2021-04-16 ENCOUNTER — OUTPATIENT (OUTPATIENT)
Dept: OUTPATIENT SERVICES | Facility: HOSPITAL | Age: 81
LOS: 1 days | End: 2021-04-16
Payer: MEDICARE

## 2021-04-16 VITALS — DIASTOLIC BLOOD PRESSURE: 79 MMHG | TEMPERATURE: 98.3 F | SYSTOLIC BLOOD PRESSURE: 167 MMHG | HEART RATE: 74 BPM

## 2021-04-16 DIAGNOSIS — R35.0 FREQUENCY OF MICTURITION: ICD-10-CM

## 2021-04-16 DIAGNOSIS — G56.00 CARPAL TUNNEL SYNDROME, UNSPECIFIED UPPER LIMB: Chronic | ICD-10-CM

## 2021-04-16 DIAGNOSIS — N35.919 UNSPECIFIED URETHRAL STRICTURE, MALE, UNSPECIFIED SITE: ICD-10-CM

## 2021-04-16 DIAGNOSIS — Z87.448 PERSONAL HISTORY OF OTHER DISEASES OF URINARY SYSTEM: Chronic | ICD-10-CM

## 2021-04-16 PROCEDURE — 51702 INSERT TEMP BLADDER CATH: CPT

## 2021-04-27 DIAGNOSIS — R32 UNSPECIFIED URINARY INCONTINENCE: ICD-10-CM

## 2021-04-27 DIAGNOSIS — N35.919 UNSPECIFIED URETHRAL STRICTURE, MALE, UNSPECIFIED SITE: ICD-10-CM

## 2021-05-06 ENCOUNTER — RESULT REVIEW (OUTPATIENT)
Age: 81
End: 2021-05-06

## 2021-05-18 ENCOUNTER — APPOINTMENT (OUTPATIENT)
Dept: UROLOGY | Facility: CLINIC | Age: 81
End: 2021-05-18
Payer: MEDICARE

## 2021-05-18 ENCOUNTER — OUTPATIENT (OUTPATIENT)
Dept: OUTPATIENT SERVICES | Facility: HOSPITAL | Age: 81
LOS: 1 days | End: 2021-05-18
Payer: MEDICARE

## 2021-05-18 VITALS — SYSTOLIC BLOOD PRESSURE: 148 MMHG | DIASTOLIC BLOOD PRESSURE: 80 MMHG

## 2021-05-18 VITALS
RESPIRATION RATE: 15 BRPM | DIASTOLIC BLOOD PRESSURE: 81 MMHG | HEART RATE: 103 BPM | SYSTOLIC BLOOD PRESSURE: 212 MMHG | TEMPERATURE: 98 F

## 2021-05-18 DIAGNOSIS — Z87.448 PERSONAL HISTORY OF OTHER DISEASES OF URINARY SYSTEM: Chronic | ICD-10-CM

## 2021-05-18 DIAGNOSIS — R21 RASH AND OTHER NONSPECIFIC SKIN ERUPTION: ICD-10-CM

## 2021-05-18 DIAGNOSIS — G56.00 CARPAL TUNNEL SYNDROME, UNSPECIFIED UPPER LIMB: Chronic | ICD-10-CM

## 2021-05-18 DIAGNOSIS — R35.0 FREQUENCY OF MICTURITION: ICD-10-CM

## 2021-05-18 DIAGNOSIS — R32 UNSPECIFIED URINARY INCONTINENCE: ICD-10-CM

## 2021-05-18 PROCEDURE — 51702 INSERT TEMP BLADDER CATH: CPT

## 2021-06-07 ENCOUNTER — APPOINTMENT (OUTPATIENT)
Dept: FAMILY MEDICINE | Facility: CLINIC | Age: 81
End: 2021-06-07
Payer: MEDICARE

## 2021-06-07 VITALS
TEMPERATURE: 98.2 F | HEIGHT: 69 IN | WEIGHT: 258 LBS | HEART RATE: 84 BPM | DIASTOLIC BLOOD PRESSURE: 80 MMHG | OXYGEN SATURATION: 94 % | RESPIRATION RATE: 16 BRPM | BODY MASS INDEX: 38.21 KG/M2 | SYSTOLIC BLOOD PRESSURE: 164 MMHG

## 2021-06-07 DIAGNOSIS — N18.32 CHRONIC KIDNEY DISEASE, STAGE 3B: ICD-10-CM

## 2021-06-07 DIAGNOSIS — E11.22 TYPE 2 DIABETES MELLITUS WITH DIABETIC CHRONIC KIDNEY DISEASE: Chronic | ICD-10-CM

## 2021-06-07 PROCEDURE — 99214 OFFICE O/P EST MOD 30 MIN: CPT

## 2021-06-07 RX ORDER — CARVEDILOL 12.5 MG/1
12.5 TABLET, FILM COATED ORAL TWICE DAILY
Qty: 180 | Refills: 1 | Status: ACTIVE | COMMUNITY
Start: 1900-01-01 | End: 1900-01-01

## 2021-06-07 RX ORDER — CELECOXIB 200 MG/1
200 CAPSULE ORAL DAILY
Qty: 90 | Refills: 0 | Status: DISCONTINUED | COMMUNITY
Start: 2021-03-18 | End: 2021-06-07

## 2021-06-07 NOTE — ASSESSMENT
[FreeTextEntry1] : DEANDRA had two procedures in [facility]. bone marrow and Epidural. DEANDRA is doing better after procedures.

## 2021-06-07 NOTE — HISTORY OF PRESENT ILLNESS
[FreeTextEntry1] : DEANDRA would like carvedilol refilled \par [facility] [place]  [de-identified] : DEANDRA states he feels otherwise in good health.\par

## 2021-06-22 ENCOUNTER — OUTPATIENT (OUTPATIENT)
Dept: OUTPATIENT SERVICES | Facility: HOSPITAL | Age: 81
LOS: 1 days | End: 2021-06-22
Payer: MEDICARE

## 2021-06-22 ENCOUNTER — APPOINTMENT (OUTPATIENT)
Dept: UROLOGY | Facility: CLINIC | Age: 81
End: 2021-06-22
Payer: MEDICARE

## 2021-06-22 VITALS
SYSTOLIC BLOOD PRESSURE: 199 MMHG | HEART RATE: 89 BPM | DIASTOLIC BLOOD PRESSURE: 80 MMHG | TEMPERATURE: 98.4 F | RESPIRATION RATE: 16 BRPM

## 2021-06-22 VITALS — SYSTOLIC BLOOD PRESSURE: 193 MMHG | DIASTOLIC BLOOD PRESSURE: 72 MMHG | HEART RATE: 84 BPM | RESPIRATION RATE: 16 BRPM

## 2021-06-22 DIAGNOSIS — R35.0 FREQUENCY OF MICTURITION: ICD-10-CM

## 2021-06-22 DIAGNOSIS — N42.89 OTHER SPECIFIED DISORDERS OF PROSTATE: ICD-10-CM

## 2021-06-22 DIAGNOSIS — Z87.448 PERSONAL HISTORY OF OTHER DISEASES OF URINARY SYSTEM: Chronic | ICD-10-CM

## 2021-06-22 DIAGNOSIS — G56.00 CARPAL TUNNEL SYNDROME, UNSPECIFIED UPPER LIMB: Chronic | ICD-10-CM

## 2021-06-22 PROCEDURE — 51702 INSERT TEMP BLADDER CATH: CPT

## 2021-07-08 ENCOUNTER — LABORATORY RESULT (OUTPATIENT)
Age: 81
End: 2021-07-08

## 2021-07-09 ENCOUNTER — NON-APPOINTMENT (OUTPATIENT)
Age: 81
End: 2021-07-09

## 2021-07-15 ENCOUNTER — APPOINTMENT (OUTPATIENT)
Dept: ENDOCRINOLOGY | Facility: CLINIC | Age: 81
End: 2021-07-15

## 2021-07-20 ENCOUNTER — APPOINTMENT (OUTPATIENT)
Dept: UROLOGY | Facility: CLINIC | Age: 81
End: 2021-07-20
Payer: MEDICARE

## 2021-07-20 ENCOUNTER — OUTPATIENT (OUTPATIENT)
Dept: OUTPATIENT SERVICES | Facility: HOSPITAL | Age: 81
LOS: 1 days | End: 2021-07-20
Payer: MEDICARE

## 2021-07-20 VITALS
HEART RATE: 88 BPM | TEMPERATURE: 98 F | RESPIRATION RATE: 16 BRPM | SYSTOLIC BLOOD PRESSURE: 188 MMHG | DIASTOLIC BLOOD PRESSURE: 77 MMHG

## 2021-07-20 DIAGNOSIS — G56.00 CARPAL TUNNEL SYNDROME, UNSPECIFIED UPPER LIMB: Chronic | ICD-10-CM

## 2021-07-20 DIAGNOSIS — N42.89 OTHER SPECIFIED DISORDERS OF PROSTATE: ICD-10-CM

## 2021-07-20 DIAGNOSIS — Z87.448 PERSONAL HISTORY OF OTHER DISEASES OF URINARY SYSTEM: Chronic | ICD-10-CM

## 2021-07-20 DIAGNOSIS — R35.0 FREQUENCY OF MICTURITION: ICD-10-CM

## 2021-07-20 PROCEDURE — 51702 INSERT TEMP BLADDER CATH: CPT

## 2021-08-17 ENCOUNTER — OUTPATIENT (OUTPATIENT)
Dept: OUTPATIENT SERVICES | Facility: HOSPITAL | Age: 81
LOS: 1 days | End: 2021-08-17
Payer: MEDICARE

## 2021-08-17 ENCOUNTER — APPOINTMENT (OUTPATIENT)
Dept: UROLOGY | Facility: CLINIC | Age: 81
End: 2021-08-17
Payer: MEDICARE

## 2021-08-17 VITALS
RESPIRATION RATE: 17 BRPM | DIASTOLIC BLOOD PRESSURE: 78 MMHG | SYSTOLIC BLOOD PRESSURE: 175 MMHG | TEMPERATURE: 98 F | BODY MASS INDEX: 38.21 KG/M2 | WEIGHT: 258 LBS | HEIGHT: 69 IN | HEART RATE: 73 BPM

## 2021-08-17 DIAGNOSIS — Z87.448 PERSONAL HISTORY OF OTHER DISEASES OF URINARY SYSTEM: Chronic | ICD-10-CM

## 2021-08-17 DIAGNOSIS — N42.89 OTHER SPECIFIED DISORDERS OF PROSTATE: ICD-10-CM

## 2021-08-17 DIAGNOSIS — R35.0 FREQUENCY OF MICTURITION: ICD-10-CM

## 2021-08-17 DIAGNOSIS — G56.00 CARPAL TUNNEL SYNDROME, UNSPECIFIED UPPER LIMB: Chronic | ICD-10-CM

## 2021-08-17 PROCEDURE — 51702 INSERT TEMP BLADDER CATH: CPT

## 2021-09-08 ENCOUNTER — RX RENEWAL (OUTPATIENT)
Age: 81
End: 2021-09-08

## 2021-10-12 ENCOUNTER — APPOINTMENT (OUTPATIENT)
Dept: UROLOGY | Facility: CLINIC | Age: 81
End: 2021-10-12
Payer: MEDICARE

## 2021-10-12 ENCOUNTER — OUTPATIENT (OUTPATIENT)
Dept: OUTPATIENT SERVICES | Facility: HOSPITAL | Age: 81
LOS: 1 days | End: 2021-10-12
Payer: MEDICARE

## 2021-10-12 VITALS
DIASTOLIC BLOOD PRESSURE: 75 MMHG | TEMPERATURE: 97.5 F | SYSTOLIC BLOOD PRESSURE: 157 MMHG | HEART RATE: 76 BPM | RESPIRATION RATE: 17 BRPM | OXYGEN SATURATION: 100 %

## 2021-10-12 DIAGNOSIS — R32 UNSPECIFIED URINARY INCONTINENCE: ICD-10-CM

## 2021-10-12 DIAGNOSIS — N39.41 URGE INCONTINENCE: ICD-10-CM

## 2021-10-12 DIAGNOSIS — G56.00 CARPAL TUNNEL SYNDROME, UNSPECIFIED UPPER LIMB: Chronic | ICD-10-CM

## 2021-10-12 DIAGNOSIS — R35.0 FREQUENCY OF MICTURITION: ICD-10-CM

## 2021-10-12 DIAGNOSIS — N42.89 OTHER SPECIFIED DISORDERS OF PROSTATE: ICD-10-CM

## 2021-10-12 DIAGNOSIS — Z87.448 PERSONAL HISTORY OF OTHER DISEASES OF URINARY SYSTEM: Chronic | ICD-10-CM

## 2021-10-12 PROCEDURE — 51702 INSERT TEMP BLADDER CATH: CPT

## 2021-10-14 ENCOUNTER — APPOINTMENT (OUTPATIENT)
Dept: ENDOCRINOLOGY | Facility: CLINIC | Age: 81
End: 2021-10-14
Payer: MEDICARE

## 2021-10-14 VITALS
WEIGHT: 253 LBS | BODY MASS INDEX: 37.47 KG/M2 | SYSTOLIC BLOOD PRESSURE: 120 MMHG | HEIGHT: 69 IN | HEART RATE: 75 BPM | DIASTOLIC BLOOD PRESSURE: 70 MMHG

## 2021-10-14 DIAGNOSIS — R39.12 POOR URINARY STREAM: ICD-10-CM

## 2021-10-14 LAB — GLUCOSE BLDC GLUCOMTR-MCNC: 246

## 2021-10-14 PROCEDURE — 82962 GLUCOSE BLOOD TEST: CPT

## 2021-10-14 PROCEDURE — 99214 OFFICE O/P EST MOD 30 MIN: CPT | Mod: 25

## 2021-10-14 RX ORDER — DARBEPOETIN ALFA 300 UG/.6ML
300 INJECTION, SOLUTION INTRAVENOUS; SUBCUTANEOUS
Qty: 1 | Refills: 0 | Status: ACTIVE | COMMUNITY
Start: 2021-09-01

## 2021-10-14 RX ORDER — TORSEMIDE 20 MG/1
20 TABLET ORAL
Qty: 90 | Refills: 0 | Status: ACTIVE | COMMUNITY
Start: 2021-08-30

## 2021-10-14 RX ORDER — NYSTATIN 100000 [USP'U]/G
100000 CREAM TOPICAL
Qty: 1 | Refills: 1 | Status: DISCONTINUED | COMMUNITY
Start: 2021-05-18 | End: 2021-10-14

## 2021-10-14 RX ORDER — TRIAMCINOLONE ACETONIDE 1 MG/G
0.1 CREAM TOPICAL
Qty: 454 | Refills: 0 | Status: DISCONTINUED | COMMUNITY
Start: 2021-01-28 | End: 2021-10-14

## 2021-10-14 NOTE — PHYSICAL EXAM
[Alert] : alert [Well Nourished] : well nourished [No Acute Distress] : no acute distress [Normal Sclera/Conjunctiva] : normal sclera/conjunctiva [Normal Hearing] : hearing was normal [Supple] : the neck was supple [No Respiratory Distress] : no respiratory distress [Clear to Auscultation] : lungs were clear to auscultation bilaterally [Normal S1, S2] : normal S1 and S2 [Normal Rate] : heart rate was normal [Normal Bowel Sounds] : normal bowel sounds [Not Tender] : non-tender [Soft] : abdomen soft [Spine Straight] : spine straight [No Rash] : no rash [No Skin Lesions] : no skin lesions [Obese] : obese [No Accessory Muscle Use] : no accessory muscle use [Normal Rate and Effort] : normal respiratory rate and effort [Regular Rhythm] : with a regular rhythm [No Tremors] : no tremors [Oriented x3] : oriented to person, place, and time [Normal Insight/Judgement] : insight and judgment were intact [Normal Mood] : the mood was normal [de-identified] : ambulates with rolling walker

## 2021-10-14 NOTE — REVIEW OF SYSTEMS
[Back Pain] : back pain [Fatigue] : no fatigue [Decreased Appetite] : appetite not decreased [Recent Weight Gain (___ Lbs)] : no recent weight gain [Recent Weight Loss (___ Lbs)] : no recent weight loss [Fever] : no fever [Chills] : no chills [Dysphagia] : no dysphagia [Neck Pain] : no neck pain [Dysphonia] : no dysphonia [Chest Pain] : no chest pain [Palpitations] : no palpitations [Shortness Of Breath] : no shortness of breath [Cough] : no cough [Nausea] : no nausea [Constipation] : no constipation [Vomiting] : no vomiting [Diarrhea] : no diarrhea [Polyuria] : no polyuria [Dysuria] : no dysuria [Joint Pain] : no joint pain [Muscle Weakness] : no muscle weakness [Dry Skin] : no dry skin [Hair Loss] : no hair loss [Headaches] : no headaches [Tremors] : no tremors [Depression] : no depression [Anxiety] : no anxiety [Polydipsia] : no polydipsia [Cold Intolerance] : no cold intolerance [Heat Intolerance] : no heat intolerance [Easy Bleeding] : no ~M tendency for easy bleeding [Easy Bruising] : no tendency for easy bruising [FreeTextEntry3] : right eye plaque- seeing ophthalmologist  [FreeTextEntry8] : has flanagan cath [FreeTextEntry9] : injections started, uses walker

## 2021-10-14 NOTE — HISTORY OF PRESENT ILLNESS
[FreeTextEntry1] : In August and October 2020 had urethra stretcher and ( AUS) artificial urethra sphincter.\par \par Back pain started 1/16/21 - MRI of spine done showed spinal stenosis and static nerve, currently taking Celebrex\par Since 10/2020 has been having urinary continence then had Higgins Catheter placed 1/15 \par \par Recently hematology told him his PSA low   \par \par Quality: Type 2 DM\par Severity: moderate\par Duration: 1998\par Onset: routine labs\par Associated Symptoms: Nephropathy\par \par Current Regimen: \par Levemir 26 units at HS\par Glimepiride 4 mg BID \par - stopped Repaglinide 1 mg before meals \par hx of Metformin use but no longer r/t CKD3\par \par Checks BG once a day before bed. Usually in the 170s. \par per pt. average blood sugars \par no hypoglycemic symptoms\par Current \par \par exercise: none\par \par Diet: not watching \par Weight gained 9 pounds \par \par Date of last eye exam: 1 week ago (-) diabetic retinopathy, right eye plaque in vessel. Going back in 6 weeks. \par Date of last foot exam: in office only\par Date of last flu vaccine: 2020\par Date of Pneumovax: refused\par COVID vaccine: March 2021

## 2021-10-14 NOTE — ASSESSMENT
[FreeTextEntry1] : 82 y/o morbid obese male Type 2 DM, Hyperlipidemia and Vitamin D deficiency. \par \par Plan: \par Type 2 DM: needs updated labs \par - modify diet \par - continue current Rx \par - send in logs in 1 week\par - bring meter to next visit\par - continue to follow up with ophthalmology due to vision changes \par \par Hyperlipidemia: check lipids now, continue Rosuvastatin\par - repeat lipids before next visit \par \par Morbid obesity:encouraged routine exercise\par -  educated on healthy food choices - increase water intake to help kidney function \par \par Follow up with Hematology due to chronic anemia \par \par Vitamin D deficiency: check vitamin D levels now\par \par Continue to follow up with Nephrology due to elevated Creatinine, Microalbuminuria, low GFR and elevated BUN. \par  \par Follow up visit in 3 months.  [Importance of Diet and Exercise] : importance of diet and exercise to improve glycemic control, achieve weight loss and improve cardiovascular health

## 2021-10-18 ENCOUNTER — NON-APPOINTMENT (OUTPATIENT)
Age: 81
End: 2021-10-18

## 2021-11-09 ENCOUNTER — OUTPATIENT (OUTPATIENT)
Dept: OUTPATIENT SERVICES | Facility: HOSPITAL | Age: 81
LOS: 1 days | End: 2021-11-09
Payer: MEDICARE

## 2021-11-09 ENCOUNTER — APPOINTMENT (OUTPATIENT)
Dept: UROLOGY | Facility: CLINIC | Age: 81
End: 2021-11-09
Payer: MEDICARE

## 2021-11-09 VITALS
RESPIRATION RATE: 17 BRPM | BODY MASS INDEX: 37.47 KG/M2 | SYSTOLIC BLOOD PRESSURE: 158 MMHG | HEART RATE: 69 BPM | DIASTOLIC BLOOD PRESSURE: 73 MMHG | HEIGHT: 69 IN | WEIGHT: 253 LBS

## 2021-11-09 DIAGNOSIS — R32 UNSPECIFIED URINARY INCONTINENCE: ICD-10-CM

## 2021-11-09 DIAGNOSIS — Z87.448 PERSONAL HISTORY OF OTHER DISEASES OF URINARY SYSTEM: Chronic | ICD-10-CM

## 2021-11-09 DIAGNOSIS — G56.00 CARPAL TUNNEL SYNDROME, UNSPECIFIED UPPER LIMB: Chronic | ICD-10-CM

## 2021-11-09 DIAGNOSIS — R35.0 FREQUENCY OF MICTURITION: ICD-10-CM

## 2021-11-09 DIAGNOSIS — N99.114 POSTPROCEDURAL URETHRAL STRICTURE, MALE, UNSPECIFIED: ICD-10-CM

## 2021-11-09 PROCEDURE — 51702 INSERT TEMP BLADDER CATH: CPT

## 2021-12-07 ENCOUNTER — APPOINTMENT (OUTPATIENT)
Dept: UROLOGY | Facility: CLINIC | Age: 81
End: 2021-12-07
Payer: MEDICARE

## 2021-12-07 ENCOUNTER — OUTPATIENT (OUTPATIENT)
Dept: OUTPATIENT SERVICES | Facility: HOSPITAL | Age: 81
LOS: 1 days | End: 2021-12-07
Payer: MEDICARE

## 2021-12-07 VITALS
RESPIRATION RATE: 17 BRPM | TEMPERATURE: 97.5 F | SYSTOLIC BLOOD PRESSURE: 183 MMHG | HEART RATE: 69 BPM | OXYGEN SATURATION: 100 % | DIASTOLIC BLOOD PRESSURE: 79 MMHG

## 2021-12-07 DIAGNOSIS — G56.00 CARPAL TUNNEL SYNDROME, UNSPECIFIED UPPER LIMB: Chronic | ICD-10-CM

## 2021-12-07 DIAGNOSIS — N42.89 OTHER SPECIFIED DISORDERS OF PROSTATE: ICD-10-CM

## 2021-12-07 DIAGNOSIS — Z87.448 PERSONAL HISTORY OF OTHER DISEASES OF URINARY SYSTEM: Chronic | ICD-10-CM

## 2021-12-07 DIAGNOSIS — R35.0 FREQUENCY OF MICTURITION: ICD-10-CM

## 2021-12-07 PROCEDURE — 51702 INSERT TEMP BLADDER CATH: CPT

## 2021-12-23 ENCOUNTER — RESULT REVIEW (OUTPATIENT)
Age: 81
End: 2021-12-23

## 2022-01-11 ENCOUNTER — APPOINTMENT (OUTPATIENT)
Dept: UROLOGY | Facility: CLINIC | Age: 82
End: 2022-01-11
Payer: MEDICARE

## 2022-01-11 ENCOUNTER — OUTPATIENT (OUTPATIENT)
Dept: OUTPATIENT SERVICES | Facility: HOSPITAL | Age: 82
LOS: 1 days | End: 2022-01-11
Payer: MEDICARE

## 2022-01-11 VITALS
HEART RATE: 73 BPM | SYSTOLIC BLOOD PRESSURE: 143 MMHG | HEIGHT: 69 IN | TEMPERATURE: 97.6 F | BODY MASS INDEX: 37.47 KG/M2 | DIASTOLIC BLOOD PRESSURE: 69 MMHG | WEIGHT: 253 LBS | RESPIRATION RATE: 17 BRPM | OXYGEN SATURATION: 100 %

## 2022-01-11 DIAGNOSIS — Z87.448 PERSONAL HISTORY OF OTHER DISEASES OF URINARY SYSTEM: Chronic | ICD-10-CM

## 2022-01-11 DIAGNOSIS — G56.00 CARPAL TUNNEL SYNDROME, UNSPECIFIED UPPER LIMB: Chronic | ICD-10-CM

## 2022-01-11 DIAGNOSIS — R35.0 FREQUENCY OF MICTURITION: ICD-10-CM

## 2022-01-11 DIAGNOSIS — N42.89 OTHER SPECIFIED DISORDERS OF PROSTATE: ICD-10-CM

## 2022-01-11 DIAGNOSIS — R32 UNSPECIFIED URINARY INCONTINENCE: ICD-10-CM

## 2022-01-11 PROCEDURE — 51702 INSERT TEMP BLADDER CATH: CPT

## 2022-01-20 ENCOUNTER — NON-APPOINTMENT (OUTPATIENT)
Age: 82
End: 2022-01-20

## 2022-02-11 ENCOUNTER — APPOINTMENT (OUTPATIENT)
Dept: UROLOGY | Facility: CLINIC | Age: 82
End: 2022-02-11
Payer: MEDICARE

## 2022-02-11 ENCOUNTER — OUTPATIENT (OUTPATIENT)
Dept: OUTPATIENT SERVICES | Facility: HOSPITAL | Age: 82
LOS: 1 days | End: 2022-02-11
Payer: MEDICARE

## 2022-02-11 VITALS — SYSTOLIC BLOOD PRESSURE: 168 MMHG | DIASTOLIC BLOOD PRESSURE: 78 MMHG | HEART RATE: 71 BPM

## 2022-02-11 DIAGNOSIS — R35.0 FREQUENCY OF MICTURITION: ICD-10-CM

## 2022-02-11 DIAGNOSIS — N42.89 OTHER SPECIFIED DISORDERS OF PROSTATE: ICD-10-CM

## 2022-02-11 DIAGNOSIS — N99.114 POSTPROCEDURAL URETHRAL STRICTURE, MALE, UNSPECIFIED: ICD-10-CM

## 2022-02-11 DIAGNOSIS — G56.00 CARPAL TUNNEL SYNDROME, UNSPECIFIED UPPER LIMB: Chronic | ICD-10-CM

## 2022-02-11 DIAGNOSIS — Z87.448 PERSONAL HISTORY OF OTHER DISEASES OF URINARY SYSTEM: Chronic | ICD-10-CM

## 2022-02-11 PROCEDURE — 51702 INSERT TEMP BLADDER CATH: CPT

## 2022-03-08 ENCOUNTER — OUTPATIENT (OUTPATIENT)
Dept: OUTPATIENT SERVICES | Facility: HOSPITAL | Age: 82
LOS: 1 days | End: 2022-03-08
Payer: MEDICARE

## 2022-03-08 ENCOUNTER — APPOINTMENT (OUTPATIENT)
Dept: UROLOGY | Facility: CLINIC | Age: 82
End: 2022-03-08
Payer: MEDICARE

## 2022-03-08 VITALS — SYSTOLIC BLOOD PRESSURE: 170 MMHG | HEART RATE: 90 BPM | DIASTOLIC BLOOD PRESSURE: 76 MMHG

## 2022-03-08 DIAGNOSIS — Z87.448 PERSONAL HISTORY OF OTHER DISEASES OF URINARY SYSTEM: Chronic | ICD-10-CM

## 2022-03-08 DIAGNOSIS — G56.00 CARPAL TUNNEL SYNDROME, UNSPECIFIED UPPER LIMB: Chronic | ICD-10-CM

## 2022-03-08 DIAGNOSIS — R35.0 FREQUENCY OF MICTURITION: ICD-10-CM

## 2022-03-08 PROCEDURE — 51702 INSERT TEMP BLADDER CATH: CPT

## 2022-03-09 DIAGNOSIS — C61 MALIGNANT NEOPLASM OF PROSTATE: ICD-10-CM

## 2022-03-09 DIAGNOSIS — N42.89 OTHER SPECIFIED DISORDERS OF PROSTATE: ICD-10-CM

## 2022-03-16 ENCOUNTER — APPOINTMENT (OUTPATIENT)
Dept: ENDOCRINOLOGY | Facility: CLINIC | Age: 82
End: 2022-03-16
Payer: MEDICARE

## 2022-03-16 VITALS
HEART RATE: 68 BPM | BODY MASS INDEX: 37.03 KG/M2 | OXYGEN SATURATION: 92 % | WEIGHT: 250 LBS | DIASTOLIC BLOOD PRESSURE: 72 MMHG | HEIGHT: 69 IN | SYSTOLIC BLOOD PRESSURE: 124 MMHG

## 2022-03-16 LAB — GLUCOSE BLDC GLUCOMTR-MCNC: 89

## 2022-03-16 PROCEDURE — 82962 GLUCOSE BLOOD TEST: CPT

## 2022-03-16 PROCEDURE — 99214 OFFICE O/P EST MOD 30 MIN: CPT | Mod: 25

## 2022-03-16 NOTE — PHYSICAL EXAM
[Alert] : alert [Well Nourished] : well nourished [Obese] : obese [No Acute Distress] : no acute distress [Normal Sclera/Conjunctiva] : normal sclera/conjunctiva [No Proptosis] : no proptosis [Normal Hearing] : hearing was normal [Supple] : the neck was supple [No Respiratory Distress] : no respiratory distress [No Accessory Muscle Use] : no accessory muscle use [Normal Rate and Effort] : normal respiratory rate and effort [Clear to Auscultation] : lungs were clear to auscultation bilaterally [Normal S1, S2] : normal S1 and S2 [Normal Rate] : heart rate was normal [Regular Rhythm] : with a regular rhythm [No Edema] : no peripheral edema [Pedal Pulses Normal] : the pedal pulses are present [Normal Bowel Sounds] : normal bowel sounds [Not Tender] : non-tender [Soft] : abdomen soft [Spine Straight] : spine straight [No Rash] : no rash [Acanthosis Nigricans] : no acanthosis nigricans [Foot Ulcers] : no foot ulcers [Right foot was examined, including] : right foot ~C was examined, including visual inspection with sensory and pulse exams [Left foot was examined, including] : left foot ~C was examined, including visual inspection with sensory and pulse exams [Normal] : normal [Diminished Throughout Both Feet] : normal tactile sensation with monofilament testing throughout both feet [No Tremors] : no tremors [Oriented x3] : oriented to person, place, and time [Normal Affect] : the affect was normal [Normal Insight/Judgement] : insight and judgment were intact [Normal Mood] : the mood was normal [de-identified] : ambulates with rolling walker

## 2022-03-16 NOTE — ASSESSMENT
[FreeTextEntry1] : 82 y/o morbid obese male Type 2 DM, Hyperlipidemia and Vitamin D deficiency. \par \par Plan: \par Type 2 DM: needs updated labs \par - modify diet \par - continue current Rx \par - send in logs in 1 week\par - bring meter to next visit\par - continue to follow up with ophthalmology due to vision changes \par \par Hyperlipidemia: check lipids now, continue Rosuvastatin\par - repeat lipids before next visit \par \par Morbid obesity:encouraged routine exercise\par -  educated on healthy food choices - increase water intake to help kidney function \par \par Follow up with Hematology due to chronic anemia \par \par Vitamin D deficiency: check vitamin D levels now\par \par Continue to follow up with Nephrology.\par  \par Follow up visit in 3 months with Dr. Rod.

## 2022-03-16 NOTE — REVIEW OF SYSTEMS
[Fatigue] : no fatigue [Decreased Appetite] : appetite not decreased [Recent Weight Gain (___ Lbs)] : no recent weight gain [Recent Weight Loss (___ Lbs)] : no recent weight loss [Visual Field Defect] : no visual field defect [Blurred Vision] : no blurred vision [Dysphagia] : no dysphagia [Neck Pain] : no neck pain [Dysphonia] : no dysphonia [Chest Pain] : no chest pain [Palpitations] : no palpitations [Constipation] : no constipation [Diarrhea] : no diarrhea [Polyuria] : no polyuria [Dysuria] : no dysuria [Headaches] : no headaches [Tremors] : no tremors [Depression] : no depression [Anxiety] : no anxiety [Polydipsia] : no polydipsia [Cold Intolerance] : no cold intolerance [Heat Intolerance] : no heat intolerance [Swelling] : no swelling [FreeTextEntry2] : stable

## 2022-03-16 NOTE — HISTORY OF PRESENT ILLNESS
[FreeTextEntry1] : In August and October 2020 had urethra stretcher and ( AUS) artificial urethra sphincter.\par \par Back pain started 1/16/21 - MRI of spine done showed spinal stenosis and static nerve, currently taking Celebrex\par Since 10/2020 has been having urinary continence then had Higgins Catheter placed 1/15 \par \par Interval History: Pt. had MLD procedure (spinal surgery) 12/23/22. Two months had a cough and started on Singulair along with nasal spray since then coughing has resolved.    \par Pt. wife recently had a hip surgery.\par \par Quality: Type 2 DM\par Severity: moderate\par Duration: 1998\par Onset: routine labs\par Associated Symptoms: Nephropathy\par \par Current Regimen: \par Levemir 26 units at HS\par Glimepiride 4 mg BID \par - stopped Repaglinide 1 mg before meals \par hx of Metformin use but no longer r/t CKD3\par \par Checks BG once a day before bed. Usually in the 170s. \par per pt. average blood sugars after dinner  160-250 \par no hypoglycemic symptoms\par Current BG 89 - fasting \par \par exercise: none\par \par Diet: not watching \par Weight stable \par \par Date of last eye exam: last week (-) diabetic retinopathy, right eye plaque in vessel. Going back in 6 months\par Date of last foot exam: in office only \par Date of last flu vaccine: 2021\par Date of Pneumovax: refused\par COVID vaccine: March 2021

## 2022-03-25 ENCOUNTER — NON-APPOINTMENT (OUTPATIENT)
Age: 82
End: 2022-03-25

## 2022-03-31 RX ORDER — BLOOD SUGAR DIAGNOSTIC
STRIP MISCELLANEOUS
Qty: 400 | Refills: 1 | Status: ACTIVE | COMMUNITY
Start: 1900-01-01 | End: 1900-01-01

## 2022-04-03 ENCOUNTER — RX RENEWAL (OUTPATIENT)
Age: 82
End: 2022-04-03

## 2022-04-08 ENCOUNTER — APPOINTMENT (OUTPATIENT)
Dept: UROLOGY | Facility: CLINIC | Age: 82
End: 2022-04-08

## 2022-04-08 ENCOUNTER — APPOINTMENT (OUTPATIENT)
Dept: UROLOGY | Facility: CLINIC | Age: 82
End: 2022-04-08
Payer: MEDICARE

## 2022-04-08 ENCOUNTER — OUTPATIENT (OUTPATIENT)
Dept: OUTPATIENT SERVICES | Facility: HOSPITAL | Age: 82
LOS: 1 days | End: 2022-04-08
Payer: MEDICARE

## 2022-04-08 VITALS — SYSTOLIC BLOOD PRESSURE: 163 MMHG | HEART RATE: 76 BPM | DIASTOLIC BLOOD PRESSURE: 73 MMHG

## 2022-04-08 DIAGNOSIS — R32 UNSPECIFIED URINARY INCONTINENCE: ICD-10-CM

## 2022-04-08 DIAGNOSIS — Z87.448 PERSONAL HISTORY OF OTHER DISEASES OF URINARY SYSTEM: Chronic | ICD-10-CM

## 2022-04-08 DIAGNOSIS — R35.0 FREQUENCY OF MICTURITION: ICD-10-CM

## 2022-04-08 DIAGNOSIS — G56.00 CARPAL TUNNEL SYNDROME, UNSPECIFIED UPPER LIMB: Chronic | ICD-10-CM

## 2022-04-08 PROCEDURE — 51702 INSERT TEMP BLADDER CATH: CPT

## 2022-04-11 DIAGNOSIS — N42.89 OTHER SPECIFIED DISORDERS OF PROSTATE: ICD-10-CM

## 2022-04-11 DIAGNOSIS — R32 UNSPECIFIED URINARY INCONTINENCE: ICD-10-CM

## 2022-05-10 ENCOUNTER — APPOINTMENT (OUTPATIENT)
Dept: UROLOGY | Facility: CLINIC | Age: 82
End: 2022-05-10
Payer: MEDICARE

## 2022-05-10 ENCOUNTER — APPOINTMENT (OUTPATIENT)
Dept: UROLOGY | Facility: CLINIC | Age: 82
End: 2022-05-10

## 2022-05-10 ENCOUNTER — NON-APPOINTMENT (OUTPATIENT)
Age: 82
End: 2022-05-10

## 2022-05-10 VITALS
HEIGHT: 69 IN | BODY MASS INDEX: 37.03 KG/M2 | SYSTOLIC BLOOD PRESSURE: 163 MMHG | OXYGEN SATURATION: 95 % | TEMPERATURE: 94.6 F | WEIGHT: 250 LBS | HEART RATE: 74 BPM | DIASTOLIC BLOOD PRESSURE: 62 MMHG

## 2022-05-10 PROCEDURE — 51702 INSERT TEMP BLADDER CATH: CPT

## 2022-06-07 ENCOUNTER — APPOINTMENT (OUTPATIENT)
Dept: UROLOGY | Facility: CLINIC | Age: 82
End: 2022-06-07
Payer: MEDICARE

## 2022-06-07 VITALS
HEART RATE: 73 BPM | TEMPERATURE: 97.1 F | BODY MASS INDEX: 37.03 KG/M2 | DIASTOLIC BLOOD PRESSURE: 61 MMHG | SYSTOLIC BLOOD PRESSURE: 125 MMHG | HEIGHT: 69 IN | WEIGHT: 250 LBS

## 2022-06-07 DIAGNOSIS — N42.89 OTHER SPECIFIED DISORDERS OF PROSTATE: ICD-10-CM

## 2022-06-07 PROCEDURE — 51701 INSERT BLADDER CATHETER: CPT

## 2022-06-17 ENCOUNTER — APPOINTMENT (OUTPATIENT)
Dept: ENDOCRINOLOGY | Facility: CLINIC | Age: 82
End: 2022-06-17

## 2022-07-05 ENCOUNTER — LABORATORY RESULT (OUTPATIENT)
Age: 82
End: 2022-07-05

## 2022-07-05 ENCOUNTER — APPOINTMENT (OUTPATIENT)
Dept: UROLOGY | Facility: CLINIC | Age: 82
End: 2022-07-05

## 2022-07-05 VITALS
TEMPERATURE: 98.2 F | SYSTOLIC BLOOD PRESSURE: 161 MMHG | WEIGHT: 250 LBS | DIASTOLIC BLOOD PRESSURE: 71 MMHG | HEART RATE: 69 BPM | BODY MASS INDEX: 37.03 KG/M2 | HEIGHT: 69 IN

## 2022-07-05 PROCEDURE — 51702 INSERT TEMP BLADDER CATH: CPT

## 2022-07-06 ENCOUNTER — APPOINTMENT (OUTPATIENT)
Dept: ENDOCRINOLOGY | Facility: CLINIC | Age: 82
End: 2022-07-06

## 2022-07-06 VITALS
BODY MASS INDEX: 36.88 KG/M2 | OXYGEN SATURATION: 92 % | WEIGHT: 249 LBS | HEART RATE: 78 BPM | HEIGHT: 69 IN | SYSTOLIC BLOOD PRESSURE: 130 MMHG | DIASTOLIC BLOOD PRESSURE: 78 MMHG

## 2022-07-06 LAB — GLUCOSE BLDC GLUCOMTR-MCNC: 90

## 2022-07-06 PROCEDURE — 82962 GLUCOSE BLOOD TEST: CPT

## 2022-07-06 PROCEDURE — 36415 COLL VENOUS BLD VENIPUNCTURE: CPT

## 2022-07-06 PROCEDURE — 95250 CONT GLUC MNTR PHYS/QHP EQP: CPT

## 2022-07-06 PROCEDURE — 99215 OFFICE O/P EST HI 40 MIN: CPT | Mod: 25

## 2022-07-06 PROCEDURE — G2212 PROLONG OUTPT/OFFICE VIS: CPT

## 2022-07-20 ENCOUNTER — NON-APPOINTMENT (OUTPATIENT)
Age: 82
End: 2022-07-20

## 2022-08-09 ENCOUNTER — APPOINTMENT (OUTPATIENT)
Dept: UROLOGY | Facility: CLINIC | Age: 82
End: 2022-08-09

## 2022-08-09 VITALS
HEIGHT: 69 IN | TEMPERATURE: 97.7 F | WEIGHT: 249 LBS | BODY MASS INDEX: 36.88 KG/M2 | DIASTOLIC BLOOD PRESSURE: 73 MMHG | HEART RATE: 80 BPM | SYSTOLIC BLOOD PRESSURE: 173 MMHG

## 2022-08-09 PROCEDURE — 51702 INSERT TEMP BLADDER CATH: CPT

## 2022-08-10 ENCOUNTER — APPOINTMENT (OUTPATIENT)
Dept: ENDOCRINOLOGY | Facility: CLINIC | Age: 82
End: 2022-08-10

## 2022-08-10 VITALS
HEIGHT: 69 IN | BODY MASS INDEX: 35.4 KG/M2 | OXYGEN SATURATION: 93 % | WEIGHT: 239 LBS | SYSTOLIC BLOOD PRESSURE: 136 MMHG | HEART RATE: 65 BPM | DIASTOLIC BLOOD PRESSURE: 70 MMHG

## 2022-08-10 DIAGNOSIS — E66.9 OBESITY, UNSPECIFIED: ICD-10-CM

## 2022-08-10 LAB — GLUCOSE BLDC GLUCOMTR-MCNC: 156

## 2022-08-10 PROCEDURE — 99215 OFFICE O/P EST HI 40 MIN: CPT | Mod: 25

## 2022-08-10 PROCEDURE — 82962 GLUCOSE BLOOD TEST: CPT

## 2022-08-10 RX ORDER — FLUTICASONE PROPIONATE 50 UG/1
50 SPRAY, METERED NASAL
Qty: 16 | Refills: 0 | Status: COMPLETED | COMMUNITY
Start: 2022-02-17

## 2022-08-10 RX ORDER — PEN NEEDLE, DIABETIC 29 G X1/2"
32G X 4 MM NEEDLE, DISPOSABLE MISCELLANEOUS
Qty: 1 | Refills: 1 | Status: ACTIVE | COMMUNITY
Start: 2018-09-17 | End: 1900-01-01

## 2022-08-10 RX ORDER — LEVOCETIRIZINE DIHYDROCHLORIDE 5 MG/1
5 TABLET ORAL
Qty: 30 | Refills: 0 | Status: COMPLETED | COMMUNITY
Start: 2022-04-28

## 2022-08-10 RX ORDER — MONTELUKAST 10 MG/1
10 TABLET, FILM COATED ORAL
Qty: 30 | Refills: 0 | Status: ACTIVE | COMMUNITY
Start: 2022-02-17

## 2022-08-10 RX ORDER — AZELASTINE HYDROCHLORIDE 137 UG/1
0.1 SPRAY, METERED NASAL
Qty: 30 | Refills: 0 | Status: COMPLETED | COMMUNITY
Start: 2022-02-17

## 2022-08-10 RX ORDER — OLMESARTAN MEDOXOMIL 5 MG/1
5 TABLET, FILM COATED ORAL
Qty: 90 | Refills: 0 | Status: ACTIVE | COMMUNITY
Start: 2022-05-31

## 2022-08-14 ENCOUNTER — NON-APPOINTMENT (OUTPATIENT)
Age: 82
End: 2022-08-14

## 2022-08-15 ENCOUNTER — NON-APPOINTMENT (OUTPATIENT)
Age: 82
End: 2022-08-15

## 2022-09-06 ENCOUNTER — APPOINTMENT (OUTPATIENT)
Dept: UROLOGY | Facility: CLINIC | Age: 82
End: 2022-09-06

## 2022-09-06 VITALS
HEART RATE: 66 BPM | SYSTOLIC BLOOD PRESSURE: 133 MMHG | DIASTOLIC BLOOD PRESSURE: 68 MMHG | BODY MASS INDEX: 35.4 KG/M2 | HEIGHT: 69 IN | WEIGHT: 239 LBS | TEMPERATURE: 97.85 F

## 2022-09-06 DIAGNOSIS — N39.41 URGE INCONTINENCE: ICD-10-CM

## 2022-09-06 PROCEDURE — 51702 INSERT TEMP BLADDER CATH: CPT

## 2022-10-13 ENCOUNTER — APPOINTMENT (OUTPATIENT)
Dept: UROLOGY | Facility: CLINIC | Age: 82
End: 2022-10-13

## 2022-10-13 VITALS
BODY MASS INDEX: 35.4 KG/M2 | WEIGHT: 239 LBS | TEMPERATURE: 98.2 F | HEART RATE: 71 BPM | HEIGHT: 69 IN | SYSTOLIC BLOOD PRESSURE: 160 MMHG | DIASTOLIC BLOOD PRESSURE: 76 MMHG

## 2022-10-13 PROCEDURE — 51702 INSERT TEMP BLADDER CATH: CPT

## 2022-11-15 ENCOUNTER — APPOINTMENT (OUTPATIENT)
Dept: UROLOGY | Facility: CLINIC | Age: 82
End: 2022-11-15

## 2022-11-15 VITALS
OXYGEN SATURATION: 95 % | HEART RATE: 67 BPM | HEIGHT: 69 IN | DIASTOLIC BLOOD PRESSURE: 65 MMHG | TEMPERATURE: 96.4 F | BODY MASS INDEX: 36.73 KG/M2 | WEIGHT: 248 LBS | SYSTOLIC BLOOD PRESSURE: 150 MMHG

## 2022-11-15 DIAGNOSIS — N32.89 OTHER SPECIFIED DISORDERS OF BLADDER: ICD-10-CM

## 2022-11-15 PROCEDURE — 51702 INSERT TEMP BLADDER CATH: CPT

## 2022-11-17 ENCOUNTER — RX RENEWAL (OUTPATIENT)
Age: 82
End: 2022-11-17

## 2022-11-17 ENCOUNTER — APPOINTMENT (OUTPATIENT)
Dept: ENDOCRINOLOGY | Facility: CLINIC | Age: 82
End: 2022-11-17

## 2022-11-17 VITALS
WEIGHT: 240 LBS | SYSTOLIC BLOOD PRESSURE: 135 MMHG | DIASTOLIC BLOOD PRESSURE: 80 MMHG | BODY MASS INDEX: 35.55 KG/M2 | HEART RATE: 86 BPM | OXYGEN SATURATION: 90 % | HEIGHT: 69 IN

## 2022-11-17 DIAGNOSIS — E78.5 HYPERLIPIDEMIA, UNSPECIFIED: ICD-10-CM

## 2022-11-17 DIAGNOSIS — E66.01 MORBID (SEVERE) OBESITY DUE TO EXCESS CALORIES: ICD-10-CM

## 2022-11-17 DIAGNOSIS — E11.649 TYPE 2 DIABETES MELLITUS WITH HYPOGLYCEMIA W/OUT COMA: ICD-10-CM

## 2022-11-17 DIAGNOSIS — E11.65 TYPE 2 DIABETES MELLITUS WITH HYPERGLYCEMIA: ICD-10-CM

## 2022-11-17 DIAGNOSIS — E11.21 TYPE 2 DIABETES MELLITUS WITH DIABETIC NEPHROPATHY: ICD-10-CM

## 2022-11-17 DIAGNOSIS — E55.9 VITAMIN D DEFICIENCY, UNSPECIFIED: ICD-10-CM

## 2022-11-17 LAB — GLUCOSE BLDC GLUCOMTR-MCNC: 102

## 2022-11-17 PROCEDURE — 82962 GLUCOSE BLOOD TEST: CPT

## 2022-11-17 PROCEDURE — 99214 OFFICE O/P EST MOD 30 MIN: CPT | Mod: 25

## 2022-11-17 RX ORDER — LINAGLIPTIN 5 MG/1
5 TABLET, FILM COATED ORAL
Qty: 90 | Refills: 1 | Status: ACTIVE | COMMUNITY
Start: 2022-08-10 | End: 1900-01-01

## 2022-11-17 RX ORDER — OXYBUTYNIN CHLORIDE 5 MG/1
5 TABLET ORAL
Qty: 30 | Refills: 0 | Status: DISCONTINUED | COMMUNITY
Start: 2021-09-17 | End: 2022-11-17

## 2022-11-17 RX ORDER — INSULIN DETEMIR 100 [IU]/ML
100 INJECTION, SOLUTION SUBCUTANEOUS
Qty: 2 | Refills: 2 | Status: ACTIVE | COMMUNITY
Start: 2018-11-14 | End: 1900-01-01

## 2022-11-17 RX ORDER — LANCETS
EACH MISCELLANEOUS
Refills: 0 | Status: DISCONTINUED | COMMUNITY
End: 2022-11-17

## 2022-11-17 RX ORDER — TRAZODONE HYDROCHLORIDE 100 MG/1
100 TABLET ORAL
Qty: 90 | Refills: 0 | Status: ACTIVE | COMMUNITY
Start: 2022-11-10

## 2022-11-17 RX ORDER — TROSPIUM CHLORIDE 60 MG/1
60 CAPSULE, EXTENDED RELEASE ORAL
Qty: 30 | Refills: 1 | Status: DISCONTINUED | COMMUNITY
Start: 2022-11-15 | End: 2022-11-17

## 2022-11-17 RX ORDER — GLIMEPIRIDE 2 MG/1
2 TABLET ORAL
Qty: 90 | Refills: 1 | Status: ACTIVE | COMMUNITY
Start: 2020-10-19 | End: 1900-01-01

## 2022-11-17 RX ORDER — NIRMATRELVIR AND RITONAVIR 150-100 MG
10 X 150 MG & KIT ORAL
Qty: 20 | Refills: 0 | Status: COMPLETED | COMMUNITY
Start: 2022-09-27

## 2022-11-29 ENCOUNTER — APPOINTMENT (OUTPATIENT)
Dept: UROLOGY | Facility: CLINIC | Age: 82
End: 2022-11-29

## 2022-11-29 VITALS
DIASTOLIC BLOOD PRESSURE: 63 MMHG | HEART RATE: 73 BPM | OXYGEN SATURATION: 95 % | SYSTOLIC BLOOD PRESSURE: 151 MMHG | TEMPERATURE: 96.4 F | WEIGHT: 240 LBS | HEIGHT: 69 IN | BODY MASS INDEX: 35.55 KG/M2

## 2022-11-29 DIAGNOSIS — R33.9 RETENTION OF URINE, UNSPECIFIED: ICD-10-CM

## 2022-11-29 PROCEDURE — 99213 OFFICE O/P EST LOW 20 MIN: CPT

## 2022-11-29 NOTE — PHYSICAL EXAM
[General Appearance - Well Developed] : well developed [General Appearance - Well Nourished] : well nourished [Normal Appearance] : normal appearance [Well Groomed] : well groomed [General Appearance - In No Acute Distress] : no acute distress [Abdomen Soft] : soft [Abdomen Tenderness] : non-tender [Costovertebral Angle Tenderness] : no ~M costovertebral angle tenderness [Urinary Bladder Findings] : the bladder was normal on palpation [FreeTextEntry1] : flanagan in place draining yellow urine [Edema] : no peripheral edema [] : no respiratory distress [Respiration, Rhythm And Depth] : normal respiratory rhythm and effort [Exaggerated Use Of Accessory Muscles For Inspiration] : no accessory muscle use [Oriented To Time, Place, And Person] : oriented to person, place, and time [Affect] : the affect was normal [Mood] : the mood was normal [Not Anxious] : not anxious [Normal Station and Gait] : the gait and station were normal for the patient's age [No Focal Deficits] : no focal deficits

## 2022-11-29 NOTE — HISTORY OF PRESENT ILLNESS
[FreeTextEntry1] : Pt comes in follow up flanagan catheter issues. Hematuria has resolved. Spasms resolved

## 2022-12-13 ENCOUNTER — APPOINTMENT (OUTPATIENT)
Dept: UROLOGY | Facility: CLINIC | Age: 82
End: 2022-12-13

## 2022-12-13 VITALS
TEMPERATURE: 97.2 F | BODY MASS INDEX: 35.55 KG/M2 | OXYGEN SATURATION: 85 % | SYSTOLIC BLOOD PRESSURE: 147 MMHG | DIASTOLIC BLOOD PRESSURE: 74 MMHG | WEIGHT: 240 LBS | HEART RATE: 76 BPM | HEIGHT: 69 IN

## 2022-12-13 PROCEDURE — 52000 CYSTOURETHROSCOPY: CPT

## 2022-12-13 RX ADMIN — GENTAMICIN SULFATE 0 MG/ML: 40 INJECTION, SOLUTION INTRAMUSCULAR; INTRAVENOUS at 00:00

## 2022-12-20 ENCOUNTER — APPOINTMENT (OUTPATIENT)
Dept: UROLOGY | Facility: CLINIC | Age: 82
End: 2022-12-20

## 2022-12-20 VITALS
HEIGHT: 69 IN | SYSTOLIC BLOOD PRESSURE: 127 MMHG | TEMPERATURE: 97.3 F | BODY MASS INDEX: 35.55 KG/M2 | DIASTOLIC BLOOD PRESSURE: 57 MMHG | WEIGHT: 240 LBS | HEART RATE: 85 BPM

## 2022-12-20 DIAGNOSIS — C79.51 SECONDARY MALIGNANT NEOPLASM OF BONE: ICD-10-CM

## 2022-12-20 DIAGNOSIS — R31.0 GROSS HEMATURIA: ICD-10-CM

## 2022-12-20 DIAGNOSIS — C61 MALIGNANT NEOPLASM OF PROSTATE: ICD-10-CM

## 2022-12-20 PROCEDURE — ZZZZZ: CPT

## 2022-12-20 NOTE — ASSESSMENT
[FreeTextEntry1] : Patient with indwelling Higgins catheter and episode of the gross hematuria that started a week ago.  The gross hematuria became worse.  Patient already has a cystoscopy that showed a suspicious lesion in the posterior bladder wall.\par We decided to send patient to the emergency room for admission and emergent cystoscopy with fulguration of the bleeding area.

## 2022-12-20 NOTE — HISTORY OF PRESENT ILLNESS
[FreeTextEntry1] : 82 year-old patient presented today to the office with a gross hematuria.  This patient was seen by me and Dr. Joyce 1 week ago during that cystoscopy and we found a suspicious lesion on the posterior bladder wall that could explain the bleeding.\par Patient is known to had prostate cancer and radiation in the past.  Patient also is known to suffer from incontinence and was treated with the AUS without no improvement.  After this patient has indwelling Higgins catheter.\par Patient informed me that during the several past days the hematuria changed color from the p.m. to the black/dark red.  She also passed several small clots through the catheter.

## 2022-12-20 NOTE — PHYSICAL EXAM
[General Appearance - Well Developed] : well developed [General Appearance - Well Nourished] : well nourished [Normal Appearance] : normal appearance [Well Groomed] : well groomed [General Appearance - In No Acute Distress] : no acute distress [Abdomen Soft] : soft [Abdomen Tenderness] : non-tender [Costovertebral Angle Tenderness] : no ~M costovertebral angle tenderness [Urethral Meatus] : meatus normal [Urinary Bladder Findings] : the bladder was normal on palpation [Scrotum] : the scrotum was normal [Testes Mass (___cm)] : there were no testicular masses [FreeTextEntry1] : Higgins catheter with gross hematuria in the urinary bag [Edema] : no peripheral edema [] : no respiratory distress [Respiration, Rhythm And Depth] : normal respiratory rhythm and effort [Exaggerated Use Of Accessory Muscles For Inspiration] : no accessory muscle use [Oriented To Time, Place, And Person] : oriented to person, place, and time [Affect] : the affect was normal [Mood] : the mood was normal [Not Anxious] : not anxious [Normal Station and Gait] : the gait and station were normal for the patient's age [No Focal Deficits] : no focal deficits [No Palpable Adenopathy] : no palpable adenopathy

## 2022-12-28 ENCOUNTER — APPOINTMENT (OUTPATIENT)
Dept: UROLOGY | Facility: CLINIC | Age: 82
End: 2022-12-28
Payer: MEDICARE

## 2022-12-28 VITALS
TEMPERATURE: 98 F | WEIGHT: 240 LBS | HEIGHT: 69 IN | HEART RATE: 71 BPM | DIASTOLIC BLOOD PRESSURE: 64 MMHG | BODY MASS INDEX: 35.55 KG/M2 | SYSTOLIC BLOOD PRESSURE: 137 MMHG

## 2022-12-28 DIAGNOSIS — C67.4 MALIGNANT NEOPLASM OF POSTERIOR WALL OF BLADDER: ICD-10-CM

## 2022-12-28 DIAGNOSIS — D49.4 NEOPLASM OF UNSPECIFIED BEHAVIOR OF BLADDER: ICD-10-CM

## 2022-12-28 DIAGNOSIS — C61 MALIGNANT NEOPLASM OF PROSTATE: ICD-10-CM

## 2022-12-28 PROCEDURE — 99214 OFFICE O/P EST MOD 30 MIN: CPT

## 2022-12-28 NOTE — PHYSICAL EXAM
[General Appearance - Well Developed] : well developed [General Appearance - Well Nourished] : well nourished [Normal Appearance] : normal appearance [Well Groomed] : well groomed [General Appearance - In No Acute Distress] : no acute distress [Abdomen Soft] : soft [Abdomen Tenderness] : non-tender [Costovertebral Angle Tenderness] : no ~M costovertebral angle tenderness [Urethral Meatus] : meatus normal [Urinary Bladder Findings] : the bladder was normal on palpation [Scrotum] : the scrotum was normal [Testes Mass (___cm)] : there were no testicular masses [FreeTextEntry1] : Higgins catheter with clear yellow urine in the urinary bag [Edema] : no peripheral edema [] : no respiratory distress [Respiration, Rhythm And Depth] : normal respiratory rhythm and effort [Exaggerated Use Of Accessory Muscles For Inspiration] : no accessory muscle use [Oriented To Time, Place, And Person] : oriented to person, place, and time [Affect] : the affect was normal [Mood] : the mood was normal [Not Anxious] : not anxious [Normal Station and Gait] : the gait and station were normal for the patient's age [No Focal Deficits] : no focal deficits [No Palpable Adenopathy] : no palpable adenopathy

## 2022-12-28 NOTE — ASSESSMENT
[FreeTextEntry1] : Patient is doing well.  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  Because the comorbidity of procedure is much higher than the benefits.\par I agree with the oncologist that we have to do the metastatic work-up about the chemotherapy also should not postpone.

## 2022-12-28 NOTE — HISTORY OF PRESENT ILLNESS
[FreeTextEntry1] : 82 year-old patient presented today to the office for the follow-up. \par Nearly 1 week ago we did cystoscopy with the emergent TURBT for the gross hematuria.  \par \par Patient is known to be diagnosed with a metastatic prostatic cancer and got radiation and hormone therapy. \par \par He also is known to suffer from incontinence and was treated with the AUS without no improvement.  After this patient has indwelling Higgins catheter.\par Today he feels good.  The urine in the urinary bag is a yellow

## 2023-01-06 ENCOUNTER — APPOINTMENT (OUTPATIENT)
Dept: UROLOGY | Facility: HOSPITAL | Age: 83
End: 2023-01-06

## 2023-01-11 NOTE — H&P PST ADULT - DENTITION
2 weeks SOB and coughing. Pt has not been tested for covid or influenza. Pt seen at PCP with lab work and xray. Xray showing pneumonia. Pt tachypnea and winded with exertion. Sx improve with rest. ABC in tact. A/OX4      
denies loose teeth and no dentures

## 2023-01-25 ENCOUNTER — APPOINTMENT (OUTPATIENT)
Dept: UROLOGY | Facility: CLINIC | Age: 83
End: 2023-01-25
Payer: MEDICARE

## 2023-01-25 VITALS
HEIGHT: 69 IN | TEMPERATURE: 97.7 F | HEART RATE: 84 BPM | DIASTOLIC BLOOD PRESSURE: 62 MMHG | BODY MASS INDEX: 35.55 KG/M2 | SYSTOLIC BLOOD PRESSURE: 116 MMHG | WEIGHT: 240 LBS

## 2023-01-25 PROCEDURE — 51702 INSERT TEMP BLADDER CATH: CPT

## 2023-02-23 ENCOUNTER — APPOINTMENT (OUTPATIENT)
Dept: UROLOGY | Facility: CLINIC | Age: 83
End: 2023-02-23

## 2023-03-15 ENCOUNTER — APPOINTMENT (OUTPATIENT)
Dept: ENDOCRINOLOGY | Facility: CLINIC | Age: 83
End: 2023-03-15

## 2023-07-25 NOTE — ED ADULT TRIAGE NOTE - ESI TRIAGE ACUITY LEVEL, MLM
2 Cyclosporine Counseling:  I discussed with the patient the risks of cyclosporine including but not limited to hypertension, gingival hyperplasia,myelosuppression, immunosuppression, liver damage, kidney damage, neurotoxicity, lymphoma, and serious infections. The patient understands that monitoring is required including baseline blood pressure, CBC, CMP, lipid panel and uric acid, and then 1-2 times monthly CMP and blood pressure.
